# Patient Record
Sex: FEMALE | Race: OTHER | NOT HISPANIC OR LATINO
[De-identification: names, ages, dates, MRNs, and addresses within clinical notes are randomized per-mention and may not be internally consistent; named-entity substitution may affect disease eponyms.]

---

## 2018-06-28 PROBLEM — Z00.00 ENCOUNTER FOR PREVENTIVE HEALTH EXAMINATION: Status: ACTIVE | Noted: 2018-06-28

## 2018-08-04 ENCOUNTER — APPOINTMENT (OUTPATIENT)
Dept: DERMATOLOGY | Facility: CLINIC | Age: 23
End: 2018-08-04
Payer: COMMERCIAL

## 2018-08-04 VITALS
BODY MASS INDEX: 22.28 KG/M2 | WEIGHT: 118 LBS | SYSTOLIC BLOOD PRESSURE: 118 MMHG | DIASTOLIC BLOOD PRESSURE: 80 MMHG | HEIGHT: 61 IN

## 2018-08-04 PROCEDURE — 99202 OFFICE O/P NEW SF 15 MIN: CPT

## 2018-09-22 ENCOUNTER — RX RENEWAL (OUTPATIENT)
Age: 23
End: 2018-09-22

## 2018-09-29 ENCOUNTER — APPOINTMENT (OUTPATIENT)
Dept: DERMATOLOGY | Facility: CLINIC | Age: 23
End: 2018-09-29
Payer: COMMERCIAL

## 2018-09-29 PROCEDURE — 99213 OFFICE O/P EST LOW 20 MIN: CPT

## 2018-10-23 ENCOUNTER — RX RENEWAL (OUTPATIENT)
Age: 23
End: 2018-10-23

## 2018-12-01 ENCOUNTER — APPOINTMENT (OUTPATIENT)
Dept: DERMATOLOGY | Facility: CLINIC | Age: 23
End: 2018-12-01
Payer: COMMERCIAL

## 2018-12-01 PROCEDURE — 99213 OFFICE O/P EST LOW 20 MIN: CPT

## 2019-03-15 ENCOUNTER — APPOINTMENT (OUTPATIENT)
Dept: DERMATOLOGY | Facility: CLINIC | Age: 24
End: 2019-03-15
Payer: COMMERCIAL

## 2019-03-15 VITALS — SYSTOLIC BLOOD PRESSURE: 100 MMHG | DIASTOLIC BLOOD PRESSURE: 80 MMHG

## 2019-03-15 DIAGNOSIS — L81.0 POSTINFLAMMATORY HYPERPIGMENTATION: ICD-10-CM

## 2019-03-15 PROCEDURE — 99213 OFFICE O/P EST LOW 20 MIN: CPT

## 2019-03-15 RX ORDER — TRETINOIN 0.25 MG/G
0.03 CREAM TOPICAL
Qty: 1 | Refills: 4 | Status: ACTIVE | COMMUNITY
Start: 2018-09-29 | End: 1900-01-01

## 2019-03-15 RX ORDER — DOXYCYCLINE HYCLATE 100 MG/1
100 CAPSULE ORAL
Qty: 30 | Refills: 1 | Status: DISCONTINUED | COMMUNITY
Start: 2018-08-04 | End: 2019-03-15

## 2019-03-15 NOTE — HISTORY OF PRESENT ILLNESS
[FreeTextEntry1] : f/u acne [de-identified] : 22 year old female here for acne followup. Acne on face much improved and mostly clear over the past 3 months. Asymptomatic currently. Stopped doxycycline 2/2 nausea in December. Currently using BPO wash, clindamycin swabs, and tretinoin 0.025% cream. Using CeraVe lotions for moisturizer.\par \par Pt reports that she is not pregnant or breastfeeding and knows to stop medication if anything changes.\par \par \par

## 2019-03-15 NOTE — ASSESSMENT
[FreeTextEntry1] : 1) Acne, improved\par Prev on doxycycline, efficacious but stopped 2/2 nausea\par continue with BP before shower, clindamycin after the shower\par Continue with tretinoin 0.025% at night followed by Cerave PM \par \par Pt reports that she is not pregnant or breastfeeding and knows to stop medication if anything changes.\par \par 2) Post-inflammatory hyperpigmentation\par - 2/2 acne\par - Counseled about clinically benign lesions\par - No treatment indicated at this time \par \par RTC 6 months or sooner prn

## 2019-09-04 ENCOUNTER — APPOINTMENT (OUTPATIENT)
Dept: DERMATOLOGY | Facility: CLINIC | Age: 24
End: 2019-09-04
Payer: COMMERCIAL

## 2019-09-04 DIAGNOSIS — L70.0 ACNE VULGARIS: ICD-10-CM

## 2019-09-04 PROCEDURE — 99213 OFFICE O/P EST LOW 20 MIN: CPT

## 2019-09-04 RX ORDER — CLINDAMYCIN PHOSPHATE 10 MG/ML
1 SOLUTION TOPICAL
Qty: 60 | Refills: 5 | Status: ACTIVE | COMMUNITY
Start: 2018-08-04 | End: 1900-01-01

## 2019-09-04 RX ORDER — TRETINOIN 0.5 MG/G
0.05 CREAM TOPICAL
Qty: 1 | Refills: 2 | Status: ACTIVE | COMMUNITY
Start: 2019-09-04 | End: 1900-01-01

## 2020-03-27 ENCOUNTER — APPOINTMENT (OUTPATIENT)
Dept: DERMATOLOGY | Facility: CLINIC | Age: 25
End: 2020-03-27

## 2022-05-10 ENCOUNTER — TRANSCRIPTION ENCOUNTER (OUTPATIENT)
Age: 27
End: 2022-05-10

## 2022-05-10 ENCOUNTER — APPOINTMENT (OUTPATIENT)
Dept: ANTEPARTUM | Facility: CLINIC | Age: 27
End: 2022-05-10
Payer: COMMERCIAL

## 2022-05-10 ENCOUNTER — ASOB RESULT (OUTPATIENT)
Age: 27
End: 2022-05-10

## 2022-05-10 PROCEDURE — 76813 OB US NUCHAL MEAS 1 GEST: CPT

## 2022-05-10 PROCEDURE — 36416 COLLJ CAPILLARY BLOOD SPEC: CPT

## 2022-05-10 PROCEDURE — 76801 OB US < 14 WKS SINGLE FETUS: CPT

## 2022-07-01 ENCOUNTER — APPOINTMENT (OUTPATIENT)
Dept: ANTEPARTUM | Facility: CLINIC | Age: 27
End: 2022-07-01

## 2022-07-01 ENCOUNTER — ASOB RESULT (OUTPATIENT)
Age: 27
End: 2022-07-01

## 2022-07-01 PROCEDURE — 76805 OB US >/= 14 WKS SNGL FETUS: CPT

## 2022-10-13 ENCOUNTER — OUTPATIENT (OUTPATIENT)
Dept: INPATIENT UNIT | Facility: HOSPITAL | Age: 27
LOS: 1 days | Discharge: HOME | End: 2022-10-13

## 2022-10-13 VITALS — HEART RATE: 85 BPM | SYSTOLIC BLOOD PRESSURE: 118 MMHG | DIASTOLIC BLOOD PRESSURE: 73 MMHG

## 2022-10-13 VITALS
SYSTOLIC BLOOD PRESSURE: 125 MMHG | TEMPERATURE: 98 F | HEART RATE: 106 BPM | DIASTOLIC BLOOD PRESSURE: 86 MMHG | RESPIRATION RATE: 17 BRPM

## 2022-10-13 DIAGNOSIS — O26.899 OTHER SPECIFIED PREGNANCY RELATED CONDITIONS, UNSPECIFIED TRIMESTER: ICD-10-CM

## 2022-10-13 DIAGNOSIS — Z3A.00 WEEKS OF GESTATION OF PREGNANCY NOT SPECIFIED: ICD-10-CM

## 2022-10-13 LAB
APPEARANCE UR: ABNORMAL
BACTERIA # UR AUTO: ABNORMAL
BILIRUB UR-MCNC: NEGATIVE — SIGNIFICANT CHANGE UP
COLOR SPEC: SIGNIFICANT CHANGE UP
DIFF PNL FLD: NEGATIVE — SIGNIFICANT CHANGE UP
EPI CELLS # UR: 8 /HPF — HIGH (ref 0–5)
GLUCOSE UR QL: NEGATIVE — SIGNIFICANT CHANGE UP
HYALINE CASTS # UR AUTO: 2 /LPF — SIGNIFICANT CHANGE UP (ref 0–7)
KETONES UR-MCNC: NEGATIVE — SIGNIFICANT CHANGE UP
LEUKOCYTE ESTERASE UR-ACNC: ABNORMAL
NITRITE UR-MCNC: NEGATIVE — SIGNIFICANT CHANGE UP
PH UR: 6.5 — SIGNIFICANT CHANGE UP (ref 5–8)
PROT UR-MCNC: NEGATIVE — SIGNIFICANT CHANGE UP
RBC CASTS # UR COMP ASSIST: 2 /HPF — SIGNIFICANT CHANGE UP (ref 0–4)
SP GR SPEC: 1.01 — SIGNIFICANT CHANGE UP (ref 1.01–1.05)
UROBILINOGEN FLD QL: SIGNIFICANT CHANGE UP
WBC UR QL: 35 /HPF — HIGH (ref 0–5)

## 2022-10-13 PROCEDURE — 76818 FETAL BIOPHYS PROFILE W/NST: CPT | Mod: 26

## 2022-10-13 PROCEDURE — 99204 OFFICE O/P NEW MOD 45 MIN: CPT | Mod: 25

## 2022-10-13 NOTE — OB PROVIDER TRIAGE NOTE - HISTORY OF PRESENT ILLNESS
27yo female  @ 34.5 wks SLIUP here complaining of abdominal tightening, approximately once and hour. Pt also complaining of decreased FM since this morning. Pt reports she had some protein in the urine and just submitted a 24h urine that is still pending results and had pre-eclampsia blood work done on Monday 10/10. Pt denies HA/ RUQ or epigastric pain/ visual changes.    Pmhx- denies  Pshx/Hosp- denies  Meds- PNV; claritin  NKDA  Past ob- SAB x 1  Gyn- denies  Soc- deies

## 2022-10-13 NOTE — OB PROVIDER TRIAGE NOTE - NSHPPHYSICALEXAM_GEN_ALL_CORE
Vital Signs Last 24 Hrs  T(C): 36.7 (13 Oct 2022 13:48), Max: 36.7 (13 Oct 2022 13:37)  T(F): 98.06 (13 Oct 2022 13:48), Max: 98.1 (13 Oct 2022 13:37)  HR: 90 (13 Oct 2022 13:49) (90 - 106)  BP: 138/90 (13 Oct 2022 13:49) (125/86 - 138/90)  BP(mean): --  ABP: --  ABP(mean): --  RR: 17 (13 Oct 2022 13:37) (17 - 17)  SpO2: --    Gen: A&O x 3; NAD    Pulm- CTA B/L; no wheezes  Cor- Clear S1S2  Abd exam- soft and nontender    VE-closed/ 60/-2    NST cat I with 140 baseline with accels and mod variability; no ctx's Vital Signs Last 24 Hrs  T(C): 36.7 (13 Oct 2022 13:48), Max: 36.7 (13 Oct 2022 13:37)  T(F): 98.06 (13 Oct 2022 13:48), Max: 98.1 (13 Oct 2022 13:37)  HR: 93 (13 Oct 2022 14:11) (90 - 106)  BP: 111/71 (13 Oct 2022 14:11) (111/71 - 138/90)  BP(mean): --  ABP: --  ABP(mean): --  RR: 17 (13 Oct 2022 13:37) (17 - 17)  SpO2: --    Gen: A&O x 3; NAD    Pulm- CTA B/L; no wheezes  Cor- Clear S1S2  Abd exam- soft and nontender    VE-closed/ 60/-2    NST cat I with 140 baseline with accels and mod variability; no ctx's  TAS by WILLIAM Spears, NP Vital Signs Last 24 Hrs  T(C): 36.7 (13 Oct 2022 13:48), Max: 36.7 (13 Oct 2022 13:37)  T(F): 98.06 (13 Oct 2022 13:48), Max: 98.1 (13 Oct 2022 13:37)  HR: 93 (13 Oct 2022 14:11) (90 - 106)  BP: 111/71 (13 Oct 2022 14:11) (111/71 - 138/90)  BP(mean): --  ABP: --  ABP(mean): --  RR: 17 (13 Oct 2022 13:37) (17 - 17)  SpO2: --    Gen: A&O x 3; NAD    Pulm- CTA B/L; no wheezes  Cor- Clear S1S2  Abd exam- soft and nontender    VE-closed/ 60/-2    NST cat I with 140 baseline with accels and mod variability; no ctx's  TAS by WILLIAM Spears, NP-vtx; posterior placenta; MELLO-13.36; 8/8 BPP

## 2022-10-13 NOTE — OB PROVIDER TRIAGE NOTE - NSOBPROVIDERNOTE_OBGYN_ALL_OB_FT
27yo female  @ 34.5 wks SLIUP uncomp PNC here complaining of one ctx's an hour with decreased FM  -NST cat I with accels   -TAS is 25yo female  @ 34.5 wks SLIUP uncomp PNC here complaining of one ctx's an hour with decreased FM  -NST cat I with accels   -TAS is 8/8  -pt reports GFM at this time  -pt was dw Dr Salgado  -pt was cleared with dc home with instructions

## 2022-11-10 ENCOUNTER — APPOINTMENT (OUTPATIENT)
Dept: ANTEPARTUM | Facility: CLINIC | Age: 27
End: 2022-11-10

## 2022-11-10 ENCOUNTER — NON-APPOINTMENT (OUTPATIENT)
Age: 27
End: 2022-11-10

## 2022-11-10 ENCOUNTER — ASOB RESULT (OUTPATIENT)
Age: 27
End: 2022-11-10

## 2022-11-10 PROCEDURE — 76816 OB US FOLLOW-UP PER FETUS: CPT

## 2022-11-10 PROCEDURE — 76819 FETAL BIOPHYS PROFIL W/O NST: CPT | Mod: 59

## 2022-11-10 PROCEDURE — 99212 OFFICE O/P EST SF 10 MIN: CPT | Mod: 25

## 2022-11-13 ENCOUNTER — INPATIENT (INPATIENT)
Facility: HOSPITAL | Age: 27
LOS: 1 days | Discharge: HOME | End: 2022-11-15
Attending: OBSTETRICS & GYNECOLOGY | Admitting: OBSTETRICS & GYNECOLOGY

## 2022-11-13 VITALS — HEART RATE: 88 BPM | OXYGEN SATURATION: 100 %

## 2022-11-13 DIAGNOSIS — O26.613 LIVER AND BILIARY TRACT DISORDERS IN PREGNANCY, THIRD TRIMESTER: ICD-10-CM

## 2022-11-13 LAB
ALBUMIN SERPL ELPH-MCNC: 3.6 G/DL — SIGNIFICANT CHANGE UP (ref 3.3–5)
ALP SERPL-CCNC: 211 U/L — HIGH (ref 40–120)
ALT FLD-CCNC: 9 U/L — SIGNIFICANT CHANGE UP (ref 4–33)
ANION GAP SERPL CALC-SCNC: 15 MMOL/L — HIGH (ref 7–14)
AST SERPL-CCNC: 14 U/L — SIGNIFICANT CHANGE UP (ref 4–32)
BASOPHILS # BLD AUTO: 0.02 K/UL — SIGNIFICANT CHANGE UP (ref 0–0.2)
BASOPHILS NFR BLD AUTO: 0.2 % — SIGNIFICANT CHANGE UP (ref 0–2)
BILIRUB SERPL-MCNC: <0.2 MG/DL — SIGNIFICANT CHANGE UP (ref 0.2–1.2)
BLD GP AB SCN SERPL QL: NEGATIVE — SIGNIFICANT CHANGE UP
BUN SERPL-MCNC: 7 MG/DL — SIGNIFICANT CHANGE UP (ref 7–23)
CALCIUM SERPL-MCNC: 9.4 MG/DL — SIGNIFICANT CHANGE UP (ref 8.4–10.5)
CHLORIDE SERPL-SCNC: 107 MMOL/L — SIGNIFICANT CHANGE UP (ref 98–107)
CO2 SERPL-SCNC: 18 MMOL/L — LOW (ref 22–31)
COVID-19 SPIKE DOMAIN AB INTERP: POSITIVE
COVID-19 SPIKE DOMAIN ANTIBODY RESULT: >250 U/ML — HIGH
CREAT SERPL-MCNC: 0.4 MG/DL — LOW (ref 0.5–1.3)
EGFR: 139 ML/MIN/1.73M2 — SIGNIFICANT CHANGE UP
EOSINOPHIL # BLD AUTO: 0.14 K/UL — SIGNIFICANT CHANGE UP (ref 0–0.5)
EOSINOPHIL NFR BLD AUTO: 1.7 % — SIGNIFICANT CHANGE UP (ref 0–6)
GLUCOSE SERPL-MCNC: 97 MG/DL — SIGNIFICANT CHANGE UP (ref 70–99)
HCT VFR BLD CALC: 39.5 % — SIGNIFICANT CHANGE UP (ref 34.5–45)
HGB BLD-MCNC: 13.1 G/DL — SIGNIFICANT CHANGE UP (ref 11.5–15.5)
IANC: 5.72 K/UL — SIGNIFICANT CHANGE UP (ref 1.8–7.4)
IMM GRANULOCYTES NFR BLD AUTO: 0.8 % — SIGNIFICANT CHANGE UP (ref 0–0.9)
LYMPHOCYTES # BLD AUTO: 1.92 K/UL — SIGNIFICANT CHANGE UP (ref 1–3.3)
LYMPHOCYTES # BLD AUTO: 22.7 % — SIGNIFICANT CHANGE UP (ref 13–44)
MCHC RBC-ENTMCNC: 28.4 PG — SIGNIFICANT CHANGE UP (ref 27–34)
MCHC RBC-ENTMCNC: 33.2 GM/DL — SIGNIFICANT CHANGE UP (ref 32–36)
MCV RBC AUTO: 85.7 FL — SIGNIFICANT CHANGE UP (ref 80–100)
MONOCYTES # BLD AUTO: 0.59 K/UL — SIGNIFICANT CHANGE UP (ref 0–0.9)
MONOCYTES NFR BLD AUTO: 7 % — SIGNIFICANT CHANGE UP (ref 2–14)
NEUTROPHILS # BLD AUTO: 5.72 K/UL — SIGNIFICANT CHANGE UP (ref 1.8–7.4)
NEUTROPHILS NFR BLD AUTO: 67.6 % — SIGNIFICANT CHANGE UP (ref 43–77)
NRBC # BLD: 0 /100 WBCS — SIGNIFICANT CHANGE UP (ref 0–0)
NRBC # FLD: 0 K/UL — SIGNIFICANT CHANGE UP (ref 0–0)
PLATELET # BLD AUTO: 203 K/UL — SIGNIFICANT CHANGE UP (ref 150–400)
POTASSIUM SERPL-MCNC: 3.7 MMOL/L — SIGNIFICANT CHANGE UP (ref 3.5–5.3)
POTASSIUM SERPL-SCNC: 3.7 MMOL/L — SIGNIFICANT CHANGE UP (ref 3.5–5.3)
PROT SERPL-MCNC: 7 G/DL — SIGNIFICANT CHANGE UP (ref 6–8.3)
RBC # BLD: 4.61 M/UL — SIGNIFICANT CHANGE UP (ref 3.8–5.2)
RBC # FLD: 16 % — HIGH (ref 10.3–14.5)
RH IG SCN BLD-IMP: POSITIVE — SIGNIFICANT CHANGE UP
RH IG SCN BLD-IMP: POSITIVE — SIGNIFICANT CHANGE UP
SARS-COV-2 IGG+IGM SERPL QL IA: >250 U/ML — HIGH
SARS-COV-2 IGG+IGM SERPL QL IA: POSITIVE
SODIUM SERPL-SCNC: 140 MMOL/L — SIGNIFICANT CHANGE UP (ref 135–145)
WBC # BLD: 8.46 K/UL — SIGNIFICANT CHANGE UP (ref 3.8–10.5)
WBC # FLD AUTO: 8.46 K/UL — SIGNIFICANT CHANGE UP (ref 3.8–10.5)

## 2022-11-13 RX ORDER — SODIUM CHLORIDE 9 MG/ML
1000 INJECTION, SOLUTION INTRAVENOUS
Refills: 0 | Status: DISCONTINUED | OUTPATIENT
Start: 2022-11-13 | End: 2022-11-14

## 2022-11-13 RX ORDER — ACETAMINOPHEN 500 MG
1000 TABLET ORAL ONCE
Refills: 0 | Status: COMPLETED | OUTPATIENT
Start: 2022-11-13 | End: 2022-11-13

## 2022-11-13 RX ORDER — CALCIUM CARBONATE 500(1250)
1 TABLET ORAL EVERY 8 HOURS
Refills: 0 | Status: DISCONTINUED | OUTPATIENT
Start: 2022-11-13 | End: 2022-11-15

## 2022-11-13 RX ORDER — CHLORHEXIDINE GLUCONATE 213 G/1000ML
1 SOLUTION TOPICAL ONCE
Refills: 0 | Status: DISCONTINUED | OUTPATIENT
Start: 2022-11-13 | End: 2022-11-14

## 2022-11-13 RX ORDER — OXYTOCIN 10 UNIT/ML
333.33 VIAL (ML) INJECTION
Qty: 20 | Refills: 0 | Status: DISCONTINUED | OUTPATIENT
Start: 2022-11-13 | End: 2022-11-14

## 2022-11-13 RX ORDER — ONDANSETRON 8 MG/1
4 TABLET, FILM COATED ORAL ONCE
Refills: 0 | Status: COMPLETED | OUTPATIENT
Start: 2022-11-13 | End: 2022-11-13

## 2022-11-13 RX ORDER — CITRIC ACID/SODIUM CITRATE 300-500 MG
15 SOLUTION, ORAL ORAL EVERY 6 HOURS
Refills: 0 | Status: DISCONTINUED | OUTPATIENT
Start: 2022-11-13 | End: 2022-11-14

## 2022-11-13 RX ADMIN — Medication 400 MILLIGRAM(S): at 23:34

## 2022-11-13 RX ADMIN — SODIUM CHLORIDE 125 MILLILITER(S): 9 INJECTION, SOLUTION INTRAVENOUS at 23:36

## 2022-11-13 RX ADMIN — ONDANSETRON 4 MILLIGRAM(S): 8 TABLET, FILM COATED ORAL at 23:56

## 2022-11-13 RX ADMIN — Medication 1 TABLET(S): at 22:13
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
## 2022-11-13 NOTE — OB PROVIDER H&P - NSICDXFAMILYHX_GEN_ALL_CORE_FT
FAMILY HISTORY:  Father  Still living? Unknown  FH: type 2 diabetes, Age at diagnosis: Age Unknown    Mother  Still living? Unknown  Family hx of hypertension, Age at diagnosis: Age Unknown

## 2022-11-13 NOTE — OB PROVIDER H&P - ASSESSMENT
A&P: Pt is a 25y/o  at 39w1d admitted for rrIOL.  Induction: admit to L&D  -buccal cytotec  - cervical balloon  -routine labs + CMP  -EFM/toco  -CLD, IV hydration  Fetal: cat 1 tracing, fetal status reassuring  GBS: neg  Analgesia: TBD      d/w Dr. Shari Mae PGY1

## 2022-11-14 LAB — T PALLIDUM AB TITR SER: NEGATIVE — SIGNIFICANT CHANGE UP

## 2022-11-14 RX ORDER — AER TRAVELER 0.5 G/1
1 SOLUTION RECTAL; TOPICAL EVERY 4 HOURS
Refills: 0 | Status: DISCONTINUED | OUTPATIENT
Start: 2022-11-14 | End: 2022-11-15

## 2022-11-14 RX ORDER — SODIUM CHLORIDE 9 MG/ML
3 INJECTION INTRAMUSCULAR; INTRAVENOUS; SUBCUTANEOUS EVERY 8 HOURS
Refills: 0 | Status: DISCONTINUED | OUTPATIENT
Start: 2022-11-14 | End: 2022-11-15

## 2022-11-14 RX ORDER — ACETAMINOPHEN 500 MG
975 TABLET ORAL
Refills: 0 | Status: DISCONTINUED | OUTPATIENT
Start: 2022-11-14 | End: 2022-11-15

## 2022-11-14 RX ORDER — IBUPROFEN 200 MG
600 TABLET ORAL EVERY 6 HOURS
Refills: 0 | Status: COMPLETED | OUTPATIENT
Start: 2022-11-14 | End: 2023-10-13

## 2022-11-14 RX ORDER — LANOLIN
1 OINTMENT (GRAM) TOPICAL EVERY 6 HOURS
Refills: 0 | Status: DISCONTINUED | OUTPATIENT
Start: 2022-11-14 | End: 2022-11-15

## 2022-11-14 RX ORDER — OXYCODONE HYDROCHLORIDE 5 MG/1
5 TABLET ORAL
Refills: 0 | Status: DISCONTINUED | OUTPATIENT
Start: 2022-11-14 | End: 2022-11-15

## 2022-11-14 RX ORDER — DIPHENHYDRAMINE HCL 50 MG
25 CAPSULE ORAL EVERY 6 HOURS
Refills: 0 | Status: DISCONTINUED | OUTPATIENT
Start: 2022-11-14 | End: 2022-11-15

## 2022-11-14 RX ORDER — DIBUCAINE 1 %
1 OINTMENT (GRAM) RECTAL EVERY 6 HOURS
Refills: 0 | Status: DISCONTINUED | OUTPATIENT
Start: 2022-11-14 | End: 2022-11-15

## 2022-11-14 RX ORDER — PRAMOXINE HYDROCHLORIDE 150 MG/15G
1 AEROSOL, FOAM RECTAL EVERY 4 HOURS
Refills: 0 | Status: DISCONTINUED | OUTPATIENT
Start: 2022-11-14 | End: 2022-11-15

## 2022-11-14 RX ORDER — OXYCODONE HYDROCHLORIDE 5 MG/1
5 TABLET ORAL ONCE
Refills: 0 | Status: DISCONTINUED | OUTPATIENT
Start: 2022-11-14 | End: 2022-11-15

## 2022-11-14 RX ORDER — IBUPROFEN 200 MG
600 TABLET ORAL EVERY 6 HOURS
Refills: 0 | Status: DISCONTINUED | OUTPATIENT
Start: 2022-11-14 | End: 2022-11-15

## 2022-11-14 RX ORDER — BENZOCAINE 10 %
1 GEL (GRAM) MUCOUS MEMBRANE EVERY 6 HOURS
Refills: 0 | Status: DISCONTINUED | OUTPATIENT
Start: 2022-11-14 | End: 2022-11-15

## 2022-11-14 RX ORDER — TETANUS TOXOID, REDUCED DIPHTHERIA TOXOID AND ACELLULAR PERTUSSIS VACCINE, ADSORBED 5; 2.5; 8; 8; 2.5 [IU]/.5ML; [IU]/.5ML; UG/.5ML; UG/.5ML; UG/.5ML
0.5 SUSPENSION INTRAMUSCULAR ONCE
Refills: 0 | Status: DISCONTINUED | OUTPATIENT
Start: 2022-11-14 | End: 2022-11-15

## 2022-11-14 RX ORDER — OXYTOCIN 10 UNIT/ML
41.67 VIAL (ML) INJECTION
Qty: 20 | Refills: 0 | Status: DISCONTINUED | OUTPATIENT
Start: 2022-11-14 | End: 2022-11-15

## 2022-11-14 RX ORDER — HYDROCORTISONE 1 %
1 OINTMENT (GRAM) TOPICAL EVERY 6 HOURS
Refills: 0 | Status: DISCONTINUED | OUTPATIENT
Start: 2022-11-14 | End: 2022-11-15

## 2022-11-14 RX ORDER — SIMETHICONE 80 MG/1
80 TABLET, CHEWABLE ORAL EVERY 4 HOURS
Refills: 0 | Status: DISCONTINUED | OUTPATIENT
Start: 2022-11-14 | End: 2022-11-15

## 2022-11-14 RX ORDER — MAGNESIUM HYDROXIDE 400 MG/1
30 TABLET, CHEWABLE ORAL
Refills: 0 | Status: DISCONTINUED | OUTPATIENT
Start: 2022-11-14 | End: 2022-11-15

## 2022-11-14 RX ORDER — KETOROLAC TROMETHAMINE 30 MG/ML
30 SYRINGE (ML) INJECTION ONCE
Refills: 0 | Status: DISCONTINUED | OUTPATIENT
Start: 2022-11-14 | End: 2022-11-14

## 2022-11-14 RX ORDER — OXYTOCIN 10 UNIT/ML
2 VIAL (ML) INJECTION
Qty: 30 | Refills: 0 | Status: DISCONTINUED | OUTPATIENT
Start: 2022-11-14 | End: 2022-11-14

## 2022-11-14 RX ORDER — OXYTOCIN 10 UNIT/ML
333.33 VIAL (ML) INJECTION
Qty: 20 | Refills: 0 | Status: DISCONTINUED | OUTPATIENT
Start: 2022-11-14 | End: 2022-11-15

## 2022-11-14 RX ADMIN — SODIUM CHLORIDE 3 MILLILITER(S): 9 INJECTION INTRAMUSCULAR; INTRAVENOUS; SUBCUTANEOUS at 22:05

## 2022-11-14 RX ADMIN — Medication 600 MILLIGRAM(S): at 18:31

## 2022-11-14 RX ADMIN — Medication 975 MILLIGRAM(S): at 16:55

## 2022-11-14 RX ADMIN — Medication 600 MILLIGRAM(S): at 17:49

## 2022-11-14 RX ADMIN — Medication 30 MILLIGRAM(S): at 11:44

## 2022-11-14 RX ADMIN — Medication 975 MILLIGRAM(S): at 20:06

## 2022-11-14 RX ADMIN — Medication 975 MILLIGRAM(S): at 16:19

## 2022-11-14 RX ADMIN — SODIUM CHLORIDE 3 MILLILITER(S): 9 INJECTION INTRAMUSCULAR; INTRAVENOUS; SUBCUTANEOUS at 14:00

## 2022-11-14 RX ADMIN — Medication 15 MILLILITER(S): at 05:45

## 2022-11-14 RX ADMIN — Medication 1000 MILLIUNIT(S)/MIN: at 11:59

## 2022-11-14 RX ADMIN — Medication 2 MILLIUNIT(S)/MIN: at 05:21

## 2022-11-14 RX ADMIN — Medication 1000 MILLIGRAM(S): at 00:15

## 2022-11-14 RX ADMIN — Medication 975 MILLIGRAM(S): at 21:05

## 2022-11-14 NOTE — OB PROVIDER LABOR PROGRESS NOTE - ASSESSMENT
- s/p cervical balloon  - pt requesting pain medication, too dilated for stadol. Pt to be brought downstairs for anesthesia evaluation for epidural     Aliza Corado, PGY2  d/w Dr. Sinha 
Ericka NUÑEZ @530a  - Continue mayuri Randolph, PGY1  d/w Dr. Sinha  
rrIOL  - FRANKLIN inserted @830  - [facility]    Cee Randolph, PGY1  As per plan previously d/w Dr Mccarthy

## 2022-11-14 NOTE — OB NEONATOLOGY/PEDIATRICIAN DELIVERY SUMMARY - NSPEDSNEONOTESA_OBGYN_ALL_OB_FT
Called by OB to attend  vacuum assisted vaginal delivery. Baby is  product of a  39 2/7 week gestation born to a     27 year old female   Maternal labs include Blood Type  AB+ , HIV neg, RPR neg , Hep B[ - ], GBS neg , rest is unremarkable. No significant maternal history. No pregnancy complications. ROM at  0536 11 , approximately  4 hrs.  Resuscitation included: warmed, dried, stimulated, suctioned.  Apgars were: 8/9.  Admit to  nursery. Called by OB to attend vacuum assisted vaginal delivery for category 2 tracing. Baby is product of a  39 2/7 week gestation born to a     27 year old female   Maternal labs include Blood Type  AB+ , HIV neg, RPR neg , Hep B[-], GBS neg, rest is unremarkable. No significant maternal history. No pregnancy complications. ROM at  0536 1114 , approximately  4 hrs. Nuchal cord noted at delivery. Terminal meconium noted at delivery. Resuscitation included: warmed, dried, stimulated, suctioned.  Apgars were: 8/9.  Admit to  nursery.

## 2022-11-14 NOTE — OB PROVIDER LABOR PROGRESS NOTE - NS_SUBJECTIVE/OBJECTIVE_OBGYN_ALL_OB_FT
Pt checked for rectal pressure
VE to evaluate progress in labor. Pt feeling increased pain.
Pt checked for CB

## 2022-11-14 NOTE — OB RN DELIVERY SUMMARY - NSSELHIDDEN_OBGYN_ALL_OB_FT
[NS_DeliveryAttending1_OBGYN_ALL_OB_FT:[id number]],[NS_DeliveryAssist1_OBGYN_ALL_OB_FT:[id number]],[NS_DeliveryRN_OBGYN_ALL_OB_FT:[id number]],[NS_CirculateRN2_OBGYN_ALL_OB_FT:[id number]]

## 2022-11-14 NOTE — LACTATION INITIAL EVALUATION - LACTATION INTERVENTIONS
initiate/review safe skin-to-skin/initiate/review hand expression/initiate/review techniques for position and latch/post discharge community resources provided/review techniques to manage sore nipples/engorgement/initiate/review breast massage/compression/reviewed components of an effective feeding and at least 8 effective feedings per day required/reviewed importance of monitoring infant diapers, the breastfeeding log, and minimum output each day/reviewed benefits and recommendations for rooming in/reviewed feeding on demand/by cue at least 8 times a day

## 2022-11-14 NOTE — OB RN DELIVERY SUMMARY - NS_SEPSISRSKCALC_OBGYN_ALL_OB_FT
EOS calculated successfully. EOS Risk Factor: 0.5/1000 live births ([facility] national incidence); GA=39w2d; Temp=98.1; ROM=3.967; GBS='Negative'; Antibiotics='No antibiotics or any antibiotics < 2 hrs prior to birth'

## 2022-11-14 NOTE — OB PROVIDER DELIVERY SUMMARY - NSPROVIDERDELIVERYNOTE_OBGYN_ALL_OB_FT
Vacuum Assisted Delivery Note    Category 2 tracing, peds present for delivery    Patient fully dilated and pushing.  Due to Category II tracing,  the decision was made to procedure with operative vaginal delivery. The patient was informed. Verbal consent was given. Episiotomy was NOT performed.  Position was LEOPOLDO. Vacuum was applied at +2 station, suction pressure at 700mm Hg initiated and gradual traction initiated with maternal pushing. Head delivered easily with 3 pulls and NO pop off. Vacuum suction was released and the vacuum removed from the fetal head. Nuchal cord noted wrapped around Right leg x1, and around neck x 1.   The anterior shoulder delivered easily with maternal expulsive efforts with the assistance of downward guidance. The posterior shoulder delivered with maternal expulsive efforts and upward guidance. The body of the fetus delivered spontaneously.  Delivery of a viable female infant. Cord clamped x 2 and cut.  Infant was handed to awaiting pediatrician. Lidocaine was infiltrated into the vaginal tissue. Second degree laceration was repaired with 2-0 Chromic, Left sulcus tare repaired with 2-0 chrmonic.   The placenta delivered spontaneously intact. Fundal massage was performed and the uterus was noted to have AVRIL atony, vigorous massage performed, clots expressed, uterus noted to be firm. IV oxytocin was administered.  Cytotec KS was given for AVRIL atony that resolved with bimanual vigorous massage. Excellent hemostasis achieved. Lap and sponge count correct. Vaginal vault free of sponges. Mother and baby to recovery in stable condition.       Surgeon:  MD Betancourt    Assistant:  JULIANN Ramirez    EBL: 791 cc     Apgars: 8/9     WeightL 2765g, 6#1

## 2022-11-14 NOTE — OB PROVIDER LABOR PROGRESS NOTE - NS_OBIHIFHRDETAILS_OBGYN_ALL_OB_FT
baseline 135, mod hernandez, occasional variables
140s, moderate variability, accels, no decels
baseline 140, mod hernandez, +accels, -decels

## 2022-11-14 NOTE — OB PROVIDER DELIVERY SUMMARY - NSSELHIDDEN_OBGYN_ALL_OB_FT
[NS_DeliveryAttending1_OBGYN_ALL_OB_FT:[id number]],[NS_DeliveryAssist1_OBGYN_ALL_OB_FT:[id number]]

## 2022-11-15 ENCOUNTER — TRANSCRIPTION ENCOUNTER (OUTPATIENT)
Age: 27
End: 2022-11-15

## 2022-11-15 VITALS
SYSTOLIC BLOOD PRESSURE: 120 MMHG | RESPIRATION RATE: 18 BRPM | HEART RATE: 89 BPM | DIASTOLIC BLOOD PRESSURE: 78 MMHG | TEMPERATURE: 98 F | OXYGEN SATURATION: 100 %

## 2022-11-15 LAB
BASOPHILS # BLD AUTO: 0.04 K/UL — SIGNIFICANT CHANGE UP (ref 0–0.2)
BASOPHILS NFR BLD AUTO: 0.3 % — SIGNIFICANT CHANGE UP (ref 0–2)
EOSINOPHIL # BLD AUTO: 0.12 K/UL — SIGNIFICANT CHANGE UP (ref 0–0.5)
EOSINOPHIL NFR BLD AUTO: 0.8 % — SIGNIFICANT CHANGE UP (ref 0–6)
HCT VFR BLD CALC: 31.2 % — LOW (ref 34.5–45)
HGB BLD-MCNC: 10.4 G/DL — LOW (ref 11.5–15.5)
IANC: 11.49 K/UL — HIGH (ref 1.8–7.4)
IMM GRANULOCYTES NFR BLD AUTO: 0.6 % — SIGNIFICANT CHANGE UP (ref 0–0.9)
LYMPHOCYTES # BLD AUTO: 18.9 % — SIGNIFICANT CHANGE UP (ref 13–44)
LYMPHOCYTES # BLD AUTO: 2.98 K/UL — SIGNIFICANT CHANGE UP (ref 1–3.3)
MCHC RBC-ENTMCNC: 29.1 PG — SIGNIFICANT CHANGE UP (ref 27–34)
MCHC RBC-ENTMCNC: 33.3 GM/DL — SIGNIFICANT CHANGE UP (ref 32–36)
MCV RBC AUTO: 87.4 FL — SIGNIFICANT CHANGE UP (ref 80–100)
MONOCYTES # BLD AUTO: 1.02 K/UL — HIGH (ref 0–0.9)
MONOCYTES NFR BLD AUTO: 6.5 % — SIGNIFICANT CHANGE UP (ref 2–14)
NEUTROPHILS # BLD AUTO: 11.49 K/UL — HIGH (ref 1.8–7.4)
NEUTROPHILS NFR BLD AUTO: 72.9 % — SIGNIFICANT CHANGE UP (ref 43–77)
NRBC # BLD: 0 /100 WBCS — SIGNIFICANT CHANGE UP (ref 0–0)
NRBC # FLD: 0 K/UL — SIGNIFICANT CHANGE UP (ref 0–0)
PLATELET # BLD AUTO: 169 K/UL — SIGNIFICANT CHANGE UP (ref 150–400)
RBC # BLD: 3.57 M/UL — LOW (ref 3.8–5.2)
RBC # FLD: 15.9 % — HIGH (ref 10.3–14.5)
WBC # BLD: 15.75 K/UL — HIGH (ref 3.8–10.5)
WBC # FLD AUTO: 15.75 K/UL — HIGH (ref 3.8–10.5)

## 2022-11-15 RX ORDER — LORATADINE 10 MG/1
0 TABLET ORAL
Qty: 0 | Refills: 0 | DISCHARGE

## 2022-11-15 RX ORDER — DIBUCAINE 1 %
1 OINTMENT (GRAM) RECTAL
Qty: 0 | Refills: 0 | DISCHARGE
Start: 2022-11-15

## 2022-11-15 RX ORDER — HYDROCORTISONE 1 %
1 OINTMENT (GRAM) TOPICAL
Qty: 0 | Refills: 0 | DISCHARGE
Start: 2022-11-15

## 2022-11-15 RX ORDER — IBUPROFEN 200 MG
1 TABLET ORAL
Qty: 0 | Refills: 0 | DISCHARGE
Start: 2022-11-15

## 2022-11-15 RX ORDER — ACETAMINOPHEN 500 MG
3 TABLET ORAL
Qty: 0 | Refills: 0 | DISCHARGE
Start: 2022-11-15

## 2022-11-15 RX ORDER — AER TRAVELER 0.5 G/1
1 SOLUTION RECTAL; TOPICAL
Qty: 0 | Refills: 0 | DISCHARGE
Start: 2022-11-15

## 2022-11-15 RX ADMIN — Medication 600 MILLIGRAM(S): at 05:19

## 2022-11-15 RX ADMIN — SODIUM CHLORIDE 3 MILLILITER(S): 9 INJECTION INTRAMUSCULAR; INTRAVENOUS; SUBCUTANEOUS at 06:02

## 2022-11-15 RX ADMIN — Medication 600 MILLIGRAM(S): at 15:12

## 2022-11-15 RX ADMIN — Medication 975 MILLIGRAM(S): at 09:34

## 2022-11-15 RX ADMIN — Medication 975 MILLIGRAM(S): at 03:11

## 2022-11-15 RX ADMIN — Medication 600 MILLIGRAM(S): at 01:45

## 2022-11-15 RX ADMIN — Medication 975 MILLIGRAM(S): at 17:30

## 2022-11-15 RX ADMIN — Medication 600 MILLIGRAM(S): at 16:00

## 2022-11-15 RX ADMIN — Medication 600 MILLIGRAM(S): at 00:51

## 2022-11-15 RX ADMIN — Medication 975 MILLIGRAM(S): at 04:02

## 2022-11-15 RX ADMIN — Medication 600 MILLIGRAM(S): at 06:13

## 2022-11-15 RX ADMIN — Medication 975 MILLIGRAM(S): at 18:00

## 2022-11-15 RX ADMIN — Medication 975 MILLIGRAM(S): at 08:54

## 2022-11-15 NOTE — DISCHARGE NOTE OB - PATIENT PORTAL LINK FT
You can access the FollowMyHealth Patient Portal offered by [facility] by registering at the following website: [email or web address]. By joining [employer]’s FollowMyHealth portal, you will also be able to view your health information using other applications (apps) compatible with our system.

## 2022-11-15 NOTE — DISCHARGE NOTE OB - CARE PLAN
1 Principal Discharge DX:	Spontaneous vaginal delivery  Assessment and plan of treatment:	routine pp recovery

## 2022-11-15 NOTE — DISCHARGE NOTE OB - MEDICATION SUMMARY - MEDICATIONS TO TAKE
I will START or STAY ON the medications listed below when I get home from the hospital:    acetaminophen 325 mg oral tablet  -- 3 tab(s) by mouth every 8 hours, As Needed  -- Indication: For mild to mod pain    ibuprofen 600 mg oral tablet  -- 1 tab(s) by mouth every 6 hours, As Needed  -- Indication: For mild to mod pain    hydrocortisone 1% topical cream  -- 1 application on skin every 6 hours, As needed, Moderate Pain (4-6)  -- Indication: For hemorrhoids     dibucaine 1% topical ointment  -- 1 application on skin every 6 hours, As needed, Perineal discomfort  -- Indication: For hemorrhoids     witch hazel 50% topical pad  -- 1 application on skin every 4 hours, As needed, Perineal discomfort  -- Indication: For hemorrhoids

## 2022-11-15 NOTE — DISCHARGE NOTE OB - NS MD DC FALL RISK RISK
For information on Fall & Injury Prevention, visit: [email or web address] OR  [email or web address] OR  https://www.cdc.gov/steadi/patient.html

## 2022-11-15 NOTE — DISCHARGE NOTE OB - CARE PROVIDER_API CALL
Lucille Salgado)  Obstetrics and Gynecology  [address] [place] [address]  Phone: [phone]  Fax: [phone]  Follow Up Time:

## 2022-11-17 ENCOUNTER — INPATIENT (INPATIENT)
Facility: HOSPITAL | Age: 27
LOS: 2 days | Discharge: HOME | End: 2022-11-20
Attending: OBSTETRICS & GYNECOLOGY | Admitting: OBSTETRICS & GYNECOLOGY

## 2022-11-17 VITALS — HEART RATE: 87 BPM | SYSTOLIC BLOOD PRESSURE: 120 MMHG | DIASTOLIC BLOOD PRESSURE: 80 MMHG

## 2022-11-17 DIAGNOSIS — O14.90 UNSPECIFIED PRE-ECLAMPSIA, UNSPECIFIED TRIMESTER: ICD-10-CM

## 2022-11-17 LAB
ALBUMIN SERPL ELPH-MCNC: 3.3 G/DL — SIGNIFICANT CHANGE UP (ref 3.3–5)
ALP SERPL-CCNC: 143 U/L — HIGH (ref 40–120)
ALT FLD-CCNC: 37 U/L — HIGH (ref 4–33)
ANION GAP SERPL CALC-SCNC: 9 MMOL/L — SIGNIFICANT CHANGE UP (ref 7–14)
APTT BLD: 27.7 SEC — SIGNIFICANT CHANGE UP (ref 27–36.3)
AST SERPL-CCNC: 34 U/L — HIGH (ref 4–32)
BASOPHILS # BLD AUTO: 0.05 K/UL — SIGNIFICANT CHANGE UP (ref 0–0.2)
BASOPHILS NFR BLD AUTO: 0.5 % — SIGNIFICANT CHANGE UP (ref 0–2)
BILIRUB SERPL-MCNC: <0.2 MG/DL — SIGNIFICANT CHANGE UP (ref 0.2–1.2)
BUN SERPL-MCNC: 7 MG/DL — SIGNIFICANT CHANGE UP (ref 7–23)
CALCIUM SERPL-MCNC: 8.8 MG/DL — SIGNIFICANT CHANGE UP (ref 8.4–10.5)
CHLORIDE SERPL-SCNC: 109 MMOL/L — HIGH (ref 98–107)
CO2 SERPL-SCNC: 24 MMOL/L — SIGNIFICANT CHANGE UP (ref 22–31)
CREAT SERPL-MCNC: 0.54 MG/DL — SIGNIFICANT CHANGE UP (ref 0.5–1.3)
EGFR: 129 ML/MIN/1.73M2 — SIGNIFICANT CHANGE UP
EOSINOPHIL # BLD AUTO: 0.41 K/UL — SIGNIFICANT CHANGE UP (ref 0–0.5)
EOSINOPHIL NFR BLD AUTO: 4.1 % — SIGNIFICANT CHANGE UP (ref 0–6)
FIBRINOGEN PPP-MCNC: 497 MG/DL — HIGH (ref 200–465)
GLUCOSE SERPL-MCNC: 96 MG/DL — SIGNIFICANT CHANGE UP (ref 70–99)
HCT VFR BLD CALC: 31.2 % — LOW (ref 34.5–45)
HGB BLD-MCNC: 10.3 G/DL — LOW (ref 11.5–15.5)
IANC: 5.82 K/UL — SIGNIFICANT CHANGE UP (ref 1.8–7.4)
IMM GRANULOCYTES NFR BLD AUTO: 0.8 % — SIGNIFICANT CHANGE UP (ref 0–0.9)
INR BLD: <0.9 RATIO — SIGNIFICANT CHANGE UP (ref 0.88–1.16)
LDH SERPL L TO P-CCNC: 245 U/L — HIGH (ref 135–225)
LYMPHOCYTES # BLD AUTO: 2.76 K/UL — SIGNIFICANT CHANGE UP (ref 1–3.3)
LYMPHOCYTES # BLD AUTO: 27.9 % — SIGNIFICANT CHANGE UP (ref 13–44)
MCHC RBC-ENTMCNC: 28.9 PG — SIGNIFICANT CHANGE UP (ref 27–34)
MCHC RBC-ENTMCNC: 33 GM/DL — SIGNIFICANT CHANGE UP (ref 32–36)
MCV RBC AUTO: 87.4 FL — SIGNIFICANT CHANGE UP (ref 80–100)
MONOCYTES # BLD AUTO: 0.76 K/UL — SIGNIFICANT CHANGE UP (ref 0–0.9)
MONOCYTES NFR BLD AUTO: 7.7 % — SIGNIFICANT CHANGE UP (ref 2–14)
NEUTROPHILS # BLD AUTO: 5.82 K/UL — SIGNIFICANT CHANGE UP (ref 1.8–7.4)
NEUTROPHILS NFR BLD AUTO: 59 % — SIGNIFICANT CHANGE UP (ref 43–77)
NRBC # BLD: 0 /100 WBCS — SIGNIFICANT CHANGE UP (ref 0–0)
NRBC # FLD: 0 K/UL — SIGNIFICANT CHANGE UP (ref 0–0)
PLATELET # BLD AUTO: 243 K/UL — SIGNIFICANT CHANGE UP (ref 150–400)
POTASSIUM SERPL-MCNC: 4.2 MMOL/L — SIGNIFICANT CHANGE UP (ref 3.5–5.3)
POTASSIUM SERPL-SCNC: 4.2 MMOL/L — SIGNIFICANT CHANGE UP (ref 3.5–5.3)
PROT SERPL-MCNC: 6.6 G/DL — SIGNIFICANT CHANGE UP (ref 6–8.3)
PROTHROM AB SERPL-ACNC: 10.3 SEC — LOW (ref 10.5–13.4)
RBC # BLD: 3.57 M/UL — LOW (ref 3.8–5.2)
RBC # FLD: 15.9 % — HIGH (ref 10.3–14.5)
SODIUM SERPL-SCNC: 142 MMOL/L — SIGNIFICANT CHANGE UP (ref 135–145)
URATE SERPL-MCNC: 5.3 MG/DL — SIGNIFICANT CHANGE UP (ref 2.5–7)
WBC # BLD: 9.88 K/UL — SIGNIFICANT CHANGE UP (ref 3.8–10.5)
WBC # FLD AUTO: 9.88 K/UL — SIGNIFICANT CHANGE UP (ref 3.8–10.5)

## 2022-11-17 RX ORDER — NIFEDIPINE 30 MG
10 TABLET, EXTENDED RELEASE 24 HR ORAL ONCE
Refills: 0 | Status: COMPLETED | OUTPATIENT
Start: 2022-11-17 | End: 2022-11-17

## 2022-11-17 RX ORDER — NIFEDIPINE 30 MG
30 TABLET, EXTENDED RELEASE 24 HR ORAL DAILY
Refills: 0 | Status: DISCONTINUED | OUTPATIENT
Start: 2022-11-17 | End: 2022-11-19

## 2022-11-17 RX ORDER — MAGNESIUM SULFATE 500 MG/ML
4 VIAL (ML) INJECTION ONCE
Refills: 0 | Status: COMPLETED | OUTPATIENT
Start: 2022-11-17 | End: 2022-11-17

## 2022-11-17 RX ORDER — MAGNESIUM SULFATE 500 MG/ML
2 VIAL (ML) INJECTION
Qty: 40 | Refills: 0 | Status: DISCONTINUED | OUTPATIENT
Start: 2022-11-17 | End: 2022-11-17

## 2022-11-17 RX ORDER — MAGNESIUM SULFATE 500 MG/ML
2 VIAL (ML) INJECTION
Qty: 40 | Refills: 0 | Status: DISCONTINUED | OUTPATIENT
Start: 2022-11-17 | End: 2022-11-18

## 2022-11-17 RX ADMIN — Medication 10 MILLIGRAM(S): at 22:34

## 2022-11-17 RX ADMIN — Medication 10 MILLIGRAM(S): at 22:07

## 2022-11-17 RX ADMIN — Medication 300 GRAM(S): at 22:48
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
## 2022-11-17 NOTE — H&P ADULT - PROBLEM SELECTOR PLAN 1
Admit for PP sPEC  D/W Dr. Betancourt  Routine Orders  HELLP labs pending  Magnesium Sulfate for seizure precaution  Procardia IR 10mg given x2  Procardia XL 30mg ordered  Serial BPs

## 2022-11-17 NOTE — H&P ADULT - NSHPPHYSICALEXAM_GEN_ALL_CORE
Assessment reveals VS  175/90, 212/96, 182/106  Afebrile   Abdomen soft, NT, gravid  A&Ox3  Lungs- clear bilateral  Heart- normal rate and regular rhythm  Extremities- Warm, Dry, no edema present, good pulses       PLAN:  Admit for PP sPEC

## 2022-11-18 PROBLEM — D64.9 ANEMIA, UNSPECIFIED: Chronic | Status: ACTIVE | Noted: 2022-11-13
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
## 2022-11-18 LAB
ALBUMIN SERPL ELPH-MCNC: 4.2 G/DL — SIGNIFICANT CHANGE UP (ref 3.3–5)
ALP SERPL-CCNC: 181 U/L — HIGH (ref 40–120)
ALT FLD-CCNC: 41 U/L — HIGH (ref 4–33)
ANION GAP SERPL CALC-SCNC: 14 MMOL/L — SIGNIFICANT CHANGE UP (ref 7–14)
APTT BLD: 31 SEC — SIGNIFICANT CHANGE UP (ref 27–36.3)
AST SERPL-CCNC: 31 U/L — SIGNIFICANT CHANGE UP (ref 4–32)
BASOPHILS # BLD AUTO: 0.03 K/UL — SIGNIFICANT CHANGE UP (ref 0–0.2)
BASOPHILS NFR BLD AUTO: 0.3 % — SIGNIFICANT CHANGE UP (ref 0–2)
BILIRUB SERPL-MCNC: <0.2 MG/DL — SIGNIFICANT CHANGE UP (ref 0.2–1.2)
BUN SERPL-MCNC: 4 MG/DL — LOW (ref 7–23)
CALCIUM SERPL-MCNC: 7.4 MG/DL — LOW (ref 8.4–10.5)
CHLORIDE SERPL-SCNC: 101 MMOL/L — SIGNIFICANT CHANGE UP (ref 98–107)
CO2 SERPL-SCNC: 23 MMOL/L — SIGNIFICANT CHANGE UP (ref 22–31)
CREAT SERPL-MCNC: 0.39 MG/DL — LOW (ref 0.5–1.3)
EGFR: 139 ML/MIN/1.73M2 — SIGNIFICANT CHANGE UP
EOSINOPHIL # BLD AUTO: 0.17 K/UL — SIGNIFICANT CHANGE UP (ref 0–0.5)
EOSINOPHIL NFR BLD AUTO: 1.9 % — SIGNIFICANT CHANGE UP (ref 0–6)
FIBRINOGEN PPP-MCNC: 603 MG/DL — HIGH (ref 200–465)
GLUCOSE SERPL-MCNC: 101 MG/DL — HIGH (ref 70–99)
HCT VFR BLD CALC: 38.6 % — SIGNIFICANT CHANGE UP (ref 34.5–45)
HGB BLD-MCNC: 12.9 G/DL — SIGNIFICANT CHANGE UP (ref 11.5–15.5)
IANC: 6.67 K/UL — SIGNIFICANT CHANGE UP (ref 1.8–7.4)
IMM GRANULOCYTES NFR BLD AUTO: 0.9 % — SIGNIFICANT CHANGE UP (ref 0–0.9)
INR BLD: 0.86 RATIO — SIGNIFICANT CHANGE UP (ref 0.88–1.16)
LDH SERPL L TO P-CCNC: 305 U/L — HIGH (ref 135–225)
LYMPHOCYTES # BLD AUTO: 1.78 K/UL — SIGNIFICANT CHANGE UP (ref 1–3.3)
LYMPHOCYTES # BLD AUTO: 19.5 % — SIGNIFICANT CHANGE UP (ref 13–44)
MAGNESIUM SERPL-MCNC: 5.7 MG/DL — HIGH (ref 1.6–2.6)
MAGNESIUM SERPL-MCNC: 6.1 MG/DL — HIGH (ref 1.6–2.6)
MAGNESIUM SERPL-MCNC: 7.2 MG/DL — CRITICAL HIGH (ref 1.6–2.6)
MCHC RBC-ENTMCNC: 28.5 PG — SIGNIFICANT CHANGE UP (ref 27–34)
MCHC RBC-ENTMCNC: 33.4 GM/DL — SIGNIFICANT CHANGE UP (ref 32–36)
MCV RBC AUTO: 85.2 FL — SIGNIFICANT CHANGE UP (ref 80–100)
MONOCYTES # BLD AUTO: 0.38 K/UL — SIGNIFICANT CHANGE UP (ref 0–0.9)
MONOCYTES NFR BLD AUTO: 4.2 % — SIGNIFICANT CHANGE UP (ref 2–14)
NEUTROPHILS # BLD AUTO: 6.67 K/UL — SIGNIFICANT CHANGE UP (ref 1.8–7.4)
NEUTROPHILS NFR BLD AUTO: 73.2 % — SIGNIFICANT CHANGE UP (ref 43–77)
NRBC # BLD: 0 /100 WBCS — SIGNIFICANT CHANGE UP (ref 0–0)
NRBC # FLD: 0 K/UL — SIGNIFICANT CHANGE UP (ref 0–0)
PLATELET # BLD AUTO: 318 K/UL — SIGNIFICANT CHANGE UP (ref 150–400)
POTASSIUM SERPL-MCNC: 3.4 MMOL/L — LOW (ref 3.5–5.3)
POTASSIUM SERPL-SCNC: 3.4 MMOL/L — LOW (ref 3.5–5.3)
PROT SERPL-MCNC: 7.8 G/DL — SIGNIFICANT CHANGE UP (ref 6–8.3)
PROTHROM AB SERPL-ACNC: 10 SEC — LOW (ref 10.5–13.4)
RBC # BLD: 4.53 M/UL — SIGNIFICANT CHANGE UP (ref 3.8–5.2)
RBC # FLD: 15.7 % — HIGH (ref 10.3–14.5)
SARS-COV-2 RNA SPEC QL NAA+PROBE: SIGNIFICANT CHANGE UP
SODIUM SERPL-SCNC: 138 MMOL/L — SIGNIFICANT CHANGE UP (ref 135–145)
URATE SERPL-MCNC: 4.8 MG/DL — SIGNIFICANT CHANGE UP (ref 2.5–7)
WBC # BLD: 9.11 K/UL — SIGNIFICANT CHANGE UP (ref 3.8–10.5)
WBC # FLD AUTO: 9.11 K/UL — SIGNIFICANT CHANGE UP (ref 3.8–10.5)

## 2022-11-18 RX ORDER — DIPHENHYDRAMINE HCL 50 MG
25 CAPSULE ORAL EVERY 6 HOURS
Refills: 0 | Status: DISCONTINUED | OUTPATIENT
Start: 2022-11-18 | End: 2022-11-20

## 2022-11-18 RX ORDER — SIMETHICONE 80 MG/1
80 TABLET, CHEWABLE ORAL EVERY 4 HOURS
Refills: 0 | Status: DISCONTINUED | OUTPATIENT
Start: 2022-11-18 | End: 2022-11-20

## 2022-11-18 RX ORDER — OXYCODONE HYDROCHLORIDE 5 MG/1
5 TABLET ORAL
Refills: 0 | Status: DISCONTINUED | OUTPATIENT
Start: 2022-11-18 | End: 2022-11-20

## 2022-11-18 RX ORDER — BENZOCAINE 10 %
1 GEL (GRAM) MUCOUS MEMBRANE EVERY 6 HOURS
Refills: 0 | Status: DISCONTINUED | OUTPATIENT
Start: 2022-11-18 | End: 2022-11-20

## 2022-11-18 RX ORDER — TETANUS TOXOID, REDUCED DIPHTHERIA TOXOID AND ACELLULAR PERTUSSIS VACCINE, ADSORBED 5; 2.5; 8; 8; 2.5 [IU]/.5ML; [IU]/.5ML; UG/.5ML; UG/.5ML; UG/.5ML
0.5 SUSPENSION INTRAMUSCULAR ONCE
Refills: 0 | Status: DISCONTINUED | OUTPATIENT
Start: 2022-11-18 | End: 2022-11-20

## 2022-11-18 RX ORDER — AER TRAVELER 0.5 G/1
1 SOLUTION RECTAL; TOPICAL EVERY 4 HOURS
Refills: 0 | Status: DISCONTINUED | OUTPATIENT
Start: 2022-11-18 | End: 2022-11-20

## 2022-11-18 RX ORDER — OXYCODONE HYDROCHLORIDE 5 MG/1
5 TABLET ORAL ONCE
Refills: 0 | Status: DISCONTINUED | OUTPATIENT
Start: 2022-11-18 | End: 2022-11-20

## 2022-11-18 RX ORDER — HEPARIN SODIUM 5000 [USP'U]/ML
5000 INJECTION INTRAVENOUS; SUBCUTANEOUS EVERY 12 HOURS
Refills: 0 | Status: DISCONTINUED | OUTPATIENT
Start: 2022-11-18 | End: 2022-11-20

## 2022-11-18 RX ORDER — PRAMOXINE HYDROCHLORIDE 150 MG/15G
1 AEROSOL, FOAM RECTAL EVERY 4 HOURS
Refills: 0 | Status: DISCONTINUED | OUTPATIENT
Start: 2022-11-18 | End: 2022-11-20

## 2022-11-18 RX ORDER — HYDROCORTISONE 1 %
1 OINTMENT (GRAM) TOPICAL EVERY 6 HOURS
Refills: 0 | Status: DISCONTINUED | OUTPATIENT
Start: 2022-11-18 | End: 2022-11-20

## 2022-11-18 RX ORDER — ACETAMINOPHEN 500 MG
1000 TABLET ORAL ONCE
Refills: 0 | Status: COMPLETED | OUTPATIENT
Start: 2022-11-18 | End: 2022-11-18

## 2022-11-18 RX ORDER — IBUPROFEN 200 MG
600 TABLET ORAL EVERY 6 HOURS
Refills: 0 | Status: DISCONTINUED | OUTPATIENT
Start: 2022-11-18 | End: 2022-11-20

## 2022-11-18 RX ORDER — LANOLIN
1 OINTMENT (GRAM) TOPICAL EVERY 6 HOURS
Refills: 0 | Status: DISCONTINUED | OUTPATIENT
Start: 2022-11-18 | End: 2022-11-20

## 2022-11-18 RX ORDER — SODIUM CHLORIDE 9 MG/ML
1000 INJECTION, SOLUTION INTRAVENOUS
Refills: 0 | Status: DISCONTINUED | OUTPATIENT
Start: 2022-11-18 | End: 2022-11-20

## 2022-11-18 RX ORDER — MAGNESIUM HYDROXIDE 400 MG/1
30 TABLET, CHEWABLE ORAL
Refills: 0 | Status: DISCONTINUED | OUTPATIENT
Start: 2022-11-18 | End: 2022-11-20

## 2022-11-18 RX ORDER — IBUPROFEN 200 MG
600 TABLET ORAL EVERY 6 HOURS
Refills: 0 | Status: COMPLETED | OUTPATIENT
Start: 2022-11-18 | End: 2023-10-17

## 2022-11-18 RX ORDER — DIBUCAINE 1 %
1 OINTMENT (GRAM) RECTAL EVERY 6 HOURS
Refills: 0 | Status: DISCONTINUED | OUTPATIENT
Start: 2022-11-18 | End: 2022-11-20

## 2022-11-18 RX ADMIN — Medication 50 GM/HR: at 00:52

## 2022-11-18 RX ADMIN — Medication 1000 MILLIGRAM(S): at 03:12

## 2022-11-18 RX ADMIN — Medication 400 MILLIGRAM(S): at 02:24

## 2022-11-18 RX ADMIN — Medication 30 MILLIGRAM(S): at 22:42

## 2022-11-18 RX ADMIN — Medication 50 GM/HR: at 11:02

## 2022-11-18 RX ADMIN — Medication 600 MILLIGRAM(S): at 16:30

## 2022-11-18 RX ADMIN — HEPARIN SODIUM 5000 UNIT(S): 5000 INJECTION INTRAVENOUS; SUBCUTANEOUS at 18:16

## 2022-11-18 RX ADMIN — Medication 600 MILLIGRAM(S): at 23:16

## 2022-11-18 RX ADMIN — Medication 50 GM/HR: at 08:35

## 2022-11-18 RX ADMIN — Medication 30 MILLIGRAM(S): at 00:51

## 2022-11-18 RX ADMIN — Medication 1 TABLET(S): at 15:32

## 2022-11-18 RX ADMIN — Medication 600 MILLIGRAM(S): at 15:33

## 2022-11-18 RX ADMIN — SODIUM CHLORIDE 50 MILLILITER(S): 9 INJECTION, SOLUTION INTRAVENOUS at 11:03

## 2022-11-18 RX ADMIN — HEPARIN SODIUM 5000 UNIT(S): 5000 INJECTION INTRAVENOUS; SUBCUTANEOUS at 05:44

## 2022-11-18 NOTE — PROVIDER CONTACT NOTE (OTHER) - BACKGROUND
Patient was a PP readmit for PEC with SF with severe BPs at home and in hospital. patient was started on a magnesium drip 11/17 at 2248 and was taken off 11/18 at 1812.
Patient was a NSD from 11/14. Patient was a PP readmit for PEC with SF with severe BPs at home and in hospital. patient was started on a magnesium drip 11/17 at 2248.

## 2022-11-18 NOTE — PROVIDER CONTACT NOTE (OTHER) - ASSESSMENT
Patient mag level was 7.2 at 1700. The magnesium was stopped by dayshift. Patient stated she was "feeling sleepy before the magnesium was stopped". Patient is now feeling more awake.
Patients vitals as charted. Patient denies any  headache, visual disturbances, N/V, nor epigastric pain. Patient is lying in bed.

## 2022-11-18 NOTE — PROVIDER CONTACT NOTE (OTHER) - SITUATION
Patients 2200 BP was elevated
Patient was on a magnesium drip due that was paused and due to be restarted at 2020.

## 2022-11-18 NOTE — PATIENT PROFILE ADULT - NSPROMEDSADMININFO_GEN_A_NUR
Medication/Dose: levothyroxine 25 MCG   Patient last seen by PCP: 9-  Next office visit with PCP: none scheduled    Last Lab:10-1-2020    Above information requires contacting patient: No    Prescription does not require PDMP check    Sent to provider to approve or deny       no concerns

## 2022-11-18 NOTE — PATIENT PROFILE ADULT - DO YOU FEEL LIKE HURTING YOURSELF OR OTHERS?
Pre-Excision Curettage Text (Leave Blank If You Do Not Want): Prior to drawing the surgical margin the visible lesion was removed with electrodesiccation and curettage to clearly define the lesion size. no

## 2022-11-18 NOTE — PATIENT PROFILE ADULT - FALL HARM RISK - UNIVERSAL INTERVENTIONS
Bed in lowest position, wheels locked, appropriate side rails in place/Call bell, personal items and telephone in reach/Instruct patient to call for assistance before getting out of bed or chair/Non-slip footwear when patient is out of bed/[place] to call system/Physically safe environment - no spills, clutter or unnecessary equipment/Purposeful Proactive Rounding/Room/bathroom lighting operational, light cord in reach

## 2022-11-18 NOTE — CHART NOTE - NSCHARTNOTEFT_GEN_A_CORE
Late entry due to patient care    Notified by RN of elevated magnesium serum result of 7.2. Magnesium sulfate paused x2 hours. Patient evaluated at bedside. Patient states she previously felt as if her eyes were very heavy and had difficulty focusing, but symptoms are now resolved now that magnesium is being held. Denies CP, SOB, dizziness, lightheadedness, abdominal pain. Lungs clear b/l on exam. B/l Bicep reflexes +2. Plan for restarting mag to be determined by night team.    d/w Dr. Esparza PGY-3  Kamilah Esparza [facility]

## 2022-11-18 NOTE — PROVIDER CONTACT NOTE (OTHER) - ACTION/TREATMENT ORDERED:
MD called about restarting the magnesium. As per MD Morris magnesium does not need to be restarted and is complete.
As per MD Morris give procardia 30 due at 0000 now.

## 2022-11-18 NOTE — PROVIDER CONTACT NOTE (OTHER) - RECOMMENDATIONS
As per MD Morris give procardia 30 due at 0000 now.
MD called about restarting the magnesium. As per MD Morris magnesium does not need to be restarted and is complete

## 2022-11-19 ENCOUNTER — TRANSCRIPTION ENCOUNTER (OUTPATIENT)
Age: 27
End: 2022-11-19

## 2022-11-19 LAB
ALBUMIN SERPL ELPH-MCNC: 3.6 G/DL — SIGNIFICANT CHANGE UP (ref 3.3–5)
ALP SERPL-CCNC: 156 U/L — HIGH (ref 40–120)
ALT FLD-CCNC: 30 U/L — SIGNIFICANT CHANGE UP (ref 4–33)
ANION GAP SERPL CALC-SCNC: 13 MMOL/L — SIGNIFICANT CHANGE UP (ref 7–14)
APTT BLD: 28.6 SEC — SIGNIFICANT CHANGE UP (ref 27–36.3)
AST SERPL-CCNC: 21 U/L — SIGNIFICANT CHANGE UP (ref 4–32)
BASOPHILS # BLD AUTO: 0.02 K/UL — SIGNIFICANT CHANGE UP (ref 0–0.2)
BASOPHILS NFR BLD AUTO: 0.3 % — SIGNIFICANT CHANGE UP (ref 0–2)
BILIRUB SERPL-MCNC: 0.2 MG/DL — SIGNIFICANT CHANGE UP (ref 0.2–1.2)
BUN SERPL-MCNC: 8 MG/DL — SIGNIFICANT CHANGE UP (ref 7–23)
CALCIUM SERPL-MCNC: 7.5 MG/DL — LOW (ref 8.4–10.5)
CHLORIDE SERPL-SCNC: 106 MMOL/L — SIGNIFICANT CHANGE UP (ref 98–107)
CO2 SERPL-SCNC: 21 MMOL/L — LOW (ref 22–31)
CREAT SERPL-MCNC: 0.51 MG/DL — SIGNIFICANT CHANGE UP (ref 0.5–1.3)
EGFR: 130 ML/MIN/1.73M2 — SIGNIFICANT CHANGE UP
EOSINOPHIL # BLD AUTO: 0.27 K/UL — SIGNIFICANT CHANGE UP (ref 0–0.5)
EOSINOPHIL NFR BLD AUTO: 3.5 % — SIGNIFICANT CHANGE UP (ref 0–6)
FIBRINOGEN PPP-MCNC: 457 MG/DL — SIGNIFICANT CHANGE UP (ref 200–465)
GLUCOSE SERPL-MCNC: 76 MG/DL — SIGNIFICANT CHANGE UP (ref 70–99)
HCT VFR BLD CALC: 35.6 % — SIGNIFICANT CHANGE UP (ref 34.5–45)
HGB BLD-MCNC: 11.8 G/DL — SIGNIFICANT CHANGE UP (ref 11.5–15.5)
IANC: 4.77 K/UL — SIGNIFICANT CHANGE UP (ref 1.8–7.4)
IMM GRANULOCYTES NFR BLD AUTO: 0.9 % — SIGNIFICANT CHANGE UP (ref 0–0.9)
LDH SERPL L TO P-CCNC: 253 U/L — HIGH (ref 135–225)
LYMPHOCYTES # BLD AUTO: 2.21 K/UL — SIGNIFICANT CHANGE UP (ref 1–3.3)
LYMPHOCYTES # BLD AUTO: 28.4 % — SIGNIFICANT CHANGE UP (ref 13–44)
MCHC RBC-ENTMCNC: 28.4 PG — SIGNIFICANT CHANGE UP (ref 27–34)
MCHC RBC-ENTMCNC: 33.1 GM/DL — SIGNIFICANT CHANGE UP (ref 32–36)
MCV RBC AUTO: 85.6 FL — SIGNIFICANT CHANGE UP (ref 80–100)
MONOCYTES # BLD AUTO: 0.44 K/UL — SIGNIFICANT CHANGE UP (ref 0–0.9)
MONOCYTES NFR BLD AUTO: 5.7 % — SIGNIFICANT CHANGE UP (ref 2–14)
NEUTROPHILS # BLD AUTO: 4.77 K/UL — SIGNIFICANT CHANGE UP (ref 1.8–7.4)
NEUTROPHILS NFR BLD AUTO: 61.2 % — SIGNIFICANT CHANGE UP (ref 43–77)
NRBC # BLD: 0 /100 WBCS — SIGNIFICANT CHANGE UP (ref 0–0)
NRBC # FLD: 0 K/UL — SIGNIFICANT CHANGE UP (ref 0–0)
PLATELET # BLD AUTO: 300 K/UL — SIGNIFICANT CHANGE UP (ref 150–400)
POTASSIUM SERPL-MCNC: 3.5 MMOL/L — SIGNIFICANT CHANGE UP (ref 3.5–5.3)
POTASSIUM SERPL-SCNC: 3.5 MMOL/L — SIGNIFICANT CHANGE UP (ref 3.5–5.3)
PROT SERPL-MCNC: 7.1 G/DL — SIGNIFICANT CHANGE UP (ref 6–8.3)
RBC # BLD: 4.16 M/UL — SIGNIFICANT CHANGE UP (ref 3.8–5.2)
RBC # FLD: 15.7 % — HIGH (ref 10.3–14.5)
SODIUM SERPL-SCNC: 140 MMOL/L — SIGNIFICANT CHANGE UP (ref 135–145)
URATE SERPL-MCNC: 5.4 MG/DL — SIGNIFICANT CHANGE UP (ref 2.5–7)
WBC # BLD: 7.78 K/UL — SIGNIFICANT CHANGE UP (ref 3.8–10.5)
WBC # FLD AUTO: 7.78 K/UL — SIGNIFICANT CHANGE UP (ref 3.8–10.5)

## 2022-11-19 RX ORDER — NIFEDIPINE 30 MG
60 TABLET, EXTENDED RELEASE 24 HR ORAL DAILY
Refills: 0 | Status: DISCONTINUED | OUTPATIENT
Start: 2022-11-19 | End: 2022-11-20

## 2022-11-19 RX ORDER — NIFEDIPINE 30 MG
1 TABLET, EXTENDED RELEASE 24 HR ORAL
Qty: 30 | Refills: 0
Start: 2022-11-19 | End: 2022-12-18

## 2022-11-19 RX ORDER — NIFEDIPINE 30 MG
30 TABLET, EXTENDED RELEASE 24 HR ORAL ONCE
Refills: 0 | Status: COMPLETED | OUTPATIENT
Start: 2022-11-19 | End: 2022-11-19

## 2022-11-19 RX ADMIN — Medication 1 TABLET(S): at 13:49

## 2022-11-19 RX ADMIN — HEPARIN SODIUM 5000 UNIT(S): 5000 INJECTION INTRAVENOUS; SUBCUTANEOUS at 17:17

## 2022-11-19 RX ADMIN — HEPARIN SODIUM 5000 UNIT(S): 5000 INJECTION INTRAVENOUS; SUBCUTANEOUS at 05:10

## 2022-11-19 RX ADMIN — Medication 600 MILLIGRAM(S): at 00:05

## 2022-11-19 RX ADMIN — Medication 600 MILLIGRAM(S): at 05:55

## 2022-11-19 RX ADMIN — Medication 30 MILLIGRAM(S): at 13:49

## 2022-11-19 RX ADMIN — Medication 600 MILLIGRAM(S): at 17:09

## 2022-11-19 RX ADMIN — Medication 600 MILLIGRAM(S): at 15:39

## 2022-11-19 RX ADMIN — Medication 600 MILLIGRAM(S): at 05:10

## 2022-11-19 RX ADMIN — Medication 600 MILLIGRAM(S): at 23:34

## 2022-11-19 NOTE — DISCHARGE NOTE ANTEPARTUM - HOSPITAL COURSE
27 y.o.  initially presenting PPD#3 from  with elevated BPs from home found to meet criteria for severe preeclampsia. HELLP labs significant for AST/ALT 34/37, otherwise within normal limits. Patient was given magnesium x 24 hours for seizure prophylaxis, required Procardia 10/10 IR for immediate BP control and was started on Procardia 30XL for maintenance of BP. On HD#3 patient had improvement of BPs and was asymptomatic. She was discharged with precautions, prescription for Procardia 30XL was sent, and to follow up closely with OB.

## 2022-11-19 NOTE — DISCHARGE NOTE ANTEPARTUM - PATIENT PORTAL LINK FT
You can access the FollowMyHealth Patient Portal offered by [facility] by registering at the following website: [email or web address]. By joining [employer]’s FollowMyHealth portal, you will also be able to view your health information using other applications (apps) compatible with our system.

## 2022-11-19 NOTE — DISCHARGE NOTE ANTEPARTUM - MEDICATION SUMMARY - MEDICATIONS TO TAKE
I will START or STAY ON the medications listed below when I get home from the hospital:    acetaminophen 325 mg oral tablet  -- 3 tab(s) by mouth every 8 hours, As Needed  -- Indication: For Pain    ibuprofen 600 mg oral tablet  -- 1 tab(s) by mouth every 6 hours, As Needed  -- Indication: For Pain    dibucaine 1% topical ointment  -- 1 application on skin every 6 hours, As needed, Perineal discomfort  -- Indication: For Perineal pain    witch hazel 50% topical pad  -- 1 application on skin every 4 hours, As needed, Perineal discomfort  -- Indication: For Perineal pain   I will START or STAY ON the medications listed below when I get home from the hospital:    acetaminophen 325 mg oral tablet  -- 3 tab(s) by mouth every 8 hours, As Needed  -- Indication: For Pain    ibuprofen 600 mg oral tablet  -- 1 tab(s) by mouth every 6 hours, As Needed  -- Indication: For oain    NIFEdipine 60 mg oral tablet, extended release  -- 1 tab(s) by mouth once a day  -- Indication: For HTN    witch hazel 50% topical pad  -- 1 application on skin every 4 hours, As needed, Perineal discomfort  -- Indication: For Perineal pain    dibucaine 1% topical ointment  -- 1 application on skin every 6 hours, As needed, Perineal discomfort  -- Indication: For Pain

## 2022-11-19 NOTE — DISCHARGE NOTE ANTEPARTUM - NS MD DC FALL RISK RISK
For information on Fall & Injury Prevention, visit: [email or web address] OR  [email or web address] OR  https://www.cdc.gov/steadi/patient.html

## 2022-11-19 NOTE — DISCHARGE NOTE ANTEPARTUM - CARE PLAN
Principal Discharge DX:	Severe preeclampsia  Assessment and plan of treatment:	Patient presented with elevated blood pressures meeting criteria for severe pre-eclampsia. She was placed on magnesium for seizure prophylaxis and Procardia 30XL for blood pressure control. On HD#3 BPs were well controlled and she denied any symptoms of SOB, HA, vision changes, RUQ pain. The patient was able to void, tolerated a regular diet, and ambulated on her own.  The patient was discharged afebrile, with stable vital signs, and appropriate pain control. She was sent home with prescription for Procardia and instructions to have close follow up with OB.   1

## 2022-11-19 NOTE — DISCHARGE NOTE ANTEPARTUM - PLAN OF CARE
Patient presented with elevated blood pressures meeting criteria for severe pre-eclampsia. She was placed on magnesium for seizure prophylaxis and Procardia 30XL for blood pressure control. On HD#3 BPs were well controlled and she denied any symptoms of SOB, HA, vision changes, RUQ pain. The patient was able to void, tolerated a regular diet, and ambulated on her own.  The patient was discharged afebrile, with stable vital signs, and appropriate pain control. She was sent home with prescription for Procardia and instructions to have close follow up with OB.

## 2022-11-19 NOTE — DISCHARGE NOTE ANTEPARTUM - ADDITIONAL INSTRUCTIONS
Continue to take your blood pressure at least twice a day, and take your medications as prescribed.  Call your OB if your pressures are greater than 150/100, or if you experience severe or persistent headaches, visual changes, persistent nausea/vomiting, trouble breathing, chest pain, or severe abdominal pain.

## 2022-11-19 NOTE — DISCHARGE NOTE ANTEPARTUM - CARE PROVIDER_API CALL
Colleen Betancourt (DO)  Obstetrics and Gynecology  [address] [place] [address]  Phone: [phone]  Established Patient  Follow Up Time: 1 week

## 2022-11-20 VITALS — HEART RATE: 95 BPM | DIASTOLIC BLOOD PRESSURE: 98 MMHG | SYSTOLIC BLOOD PRESSURE: 134 MMHG

## 2022-11-20 RX ORDER — NIFEDIPINE 30 MG
1 TABLET, EXTENDED RELEASE 24 HR ORAL
Qty: 0 | Refills: 0 | DISCHARGE
Start: 2022-11-20

## 2022-11-20 RX ADMIN — Medication 600 MILLIGRAM(S): at 00:30

## 2022-11-20 RX ADMIN — HEPARIN SODIUM 5000 UNIT(S): 5000 INJECTION INTRAVENOUS; SUBCUTANEOUS at 06:03

## 2022-11-20 RX ADMIN — Medication 600 MILLIGRAM(S): at 09:33

## 2022-11-20 RX ADMIN — Medication 600 MILLIGRAM(S): at 08:33

## 2022-11-20 RX ADMIN — Medication 60 MILLIGRAM(S): at 06:03

## 2022-11-20 NOTE — PROGRESS NOTE ADULT - ASSESSMENT
26y/o PPD#5 from  c/b PP readmit for sPEC (BP) on Mg in stable condition. HELLP labs notable for AST/ALT 34/37->31/41. Pain is well controlled and pt is doing well.     Neuro: no active issues, s/p Mg (-)   CV: Hemodynamically stable, continue to monitor BPs closely, c/w Proc 30 for BP control. S/p Proc 10/10 ()   Pulm: Saturating well on room air, encourage incentive spirometry  GI: C/w reg diet  : voiding without issue, UOP adequate, continue to monitor I&Os  Heme: c/w HSQ and SCDs for DVT ppx  Dispo: Continue routine postpartum care    Shanell Morris PGY3
28y/o PPD#4 from  c/b PP readmit for sPEC (BP) on Mg in stable condition. HELLP labs notable for AST/ALT 34/37. Pain is well controlled and pt is doing well.     Neuro: no active issues, Mg (-) 24h  CV: Hemodynamically stable, continue to monitor BPs closely, c/w Proc 30 for BP control. S/p Proc 10/10 ()   Pulm: Saturating well on room air, encourage incentive spirometry  GI: C/w reg diet  : voiding without issue, UOP adequate, continue to monitor I&Os  Heme: c/w HSQ and SCDs for DVT ppx  Dispo: Continue routine postpartum care    Shanell Morris PGY3
28y/o PPD#6 from VAVD c/b PP readmit for sPEC (BP) on Mg in stable condition. HELLP labs notable for AST/ALT 34/37->31/41. Pain is well controlled and pt is doing well.     Neuro: no active issues, s/p Mg (11/17-11/18)   CV: Hemodynamically stable, continue to monitor BPs closely, c/w Proc 60 for BP control(increased on 11/19) S/p Proc 10/10 (11/17)   Pulm: Saturating well on room air, encourage incentive spirometry  GI: C/w reg diet  : voiding without issue, UOP adequate, continue to monitor I&Os  Heme: c/w HSQ and SCDs for DVT ppx    Dispo: Continue routine postpartum care, discharge planning    Amada Carter, PGY-3

## 2022-11-20 NOTE — PROGRESS NOTE ADULT - SUBJECTIVE AND OBJECTIVE BOX
OB Progress Note: VAVD PPD#5    S: 26yo PPD#5 s/p VAVD. Patient feels well. Pain is well controlled. She is tolerating a regular diet and passing flatus. She is voiding spontaneously, and ambulating without difficulty. She is breastfeeding. Denies CP/SOB. Denies lightheadedness/dizziness. Denies N/V.    O:  Vitals:  Vital Signs Last 24 Hrs  T(C): 36.4 (19 Nov 2022 01:33), Max: 37.1 (18 Nov 2022 19:26)  T(F): 97.5 (19 Nov 2022 01:33), Max: 98.7 (18 Nov 2022 19:26)  HR: 94 (19 Nov 2022 01:33) (80 - 107)  BP: 128/83 (19 Nov 2022 01:33) (112/66 - 154/98)  BP(mean): 90 (18 Nov 2022 10:30) (75 - 90)  RR: 18 (19 Nov 2022 01:33) (16 - 18)  SpO2: 100% (19 Nov 2022 01:33) (98% - 100%)    Parameters below as of 19 Nov 2022 01:33  Patient On (Oxygen Delivery Method): room air      MEDICATIONS  (STANDING):  diphtheria/tetanus/pertussis (acellular) Vaccine (Adacel) 0.5 milliLiter(s) IntraMuscular once  heparin   Injectable 5000 Unit(s) SubCutaneous every 12 hours  ibuprofen  Tablet. 600 milliGRAM(s) Oral every 6 hours  lactated ringers. 1000 milliLiter(s) (50 mL/Hr) IV Continuous <Continuous>  NIFEdipine XL 30 milliGRAM(s) Oral daily  prenatal multivitamin 1 Tablet(s) Oral daily      Labs:  Blood type: AB Positive  Rubella IgG: RPR: Negative                          12.9   9.11 >-----------< 318    ( 11-18 @ 10:53 )             38.6                        10.3<L>   9.88 >-----------< 243    ( 11-17 @ 22:26 )             31.2<L>    11-18-22 @ 10:53      138  |  101  |  4<L>  ----------------------------<  101<H>  3.4<L>   |  23  |  0.39<L>    11-17-22 @ 22:26      142  |  109<H>  |  7   ----------------------------<  96  4.2   |  24  |  0.54        Ca    7.4<L>      18 Nov 2022 10:53  Ca    8.8      17 Nov 2022 22:26  Mg     7.20<HH>     11-18  Mg     6.10<H>     11-18  Mg     5.70<H>     11-18    TPro  7.8  /  Alb  4.2  /  TBili  <0.2  /  DBili  x   /  AST  31  /  ALT  41<H>  /  AlkPhos  181<H>  11-18-22 @ 10:53  TPro  6.6  /  Alb  3.3  /  TBili  <0.2  /  DBili  x   /  AST  34<H>  /  ALT  37<H>  /  AlkPhos  143<H>  11-17-22 @ 22:26      Physical Exam:  General: NAD  Abdomen: soft, non-tender, non-distended, fundus firm  Vaginal: Lochia wnl  Extremities: No erythema/edema  
OB Progress Note: VAVD PPD#6    S: 26yo GP PPD#6 s/p VAVD. Patient feels well. She denies F/C,CP/SOB.headache, vision changes, abdominal pain. Denies lightheadedness/dizziness. Denies N/V.    O:  Vitals:  Vital Signs Last 24 Hrs  T(C): 36.7 (20 Nov 2022 05:50), Max: 37 (20 Nov 2022 01:28)  T(F): 98 (20 Nov 2022 05:50), Max: 98.6 (20 Nov 2022 01:28)  HR: 99 (20 Nov 2022 05:50) (80 - 120)  BP: 131/86 (20 Nov 2022 05:50) (130/90 - 157/97)  BP(mean): --  RR: 17 (20 Nov 2022 05:50) (15 - 20)  SpO2: 100% (20 Nov 2022 05:50) (98% - 100%)    Parameters below as of 20 Nov 2022 05:50  Patient On (Oxygen Delivery Method): room air        MEDICATIONS  (STANDING):  diphtheria/tetanus/pertussis (acellular) Vaccine (Adacel) 0.5 milliLiter(s) IntraMuscular once  heparin   Injectable 5000 Unit(s) SubCutaneous every 12 hours  ibuprofen  Tablet. 600 milliGRAM(s) Oral every 6 hours  lactated ringers. 1000 milliLiter(s) (50 mL/Hr) IV Continuous <Continuous>  NIFEdipine XL 60 milliGRAM(s) Oral daily  prenatal multivitamin 1 Tablet(s) Oral daily      Labs:  Blood type: AB Positive  Rubella IgG: RPR: Negative                          11.8   7.78 >-----------< 300    ( 11-19 @ 05:06 )             35.6                        12.9   9.11 >-----------< 318    ( 11-18 @ 10:53 )             38.6                        10.3<L>   9.88 >-----------< 243    ( 11-17 @ 22:26 )             31.2<L>    11-19-22 @ 05:06      140  |  106  |  8   ----------------------------<  76  3.5   |  21<L>  |  0.51    11-18-22 @ 10:53      138  |  101  |  4<L>  ----------------------------<  101<H>  3.4<L>   |  23  |  0.39<L>    11-17-22 @ 22:26      142  |  109<H>  |  7   ----------------------------<  96  4.2   |  24  |  0.54        Ca    7.5<L>      19 Nov 2022 05:06  Ca    7.4<L>      18 Nov 2022 10:53  Ca    8.8      17 Nov 2022 22:26  Mg     7.20<HH>     11-18  Mg     6.10<H>     11-18  Mg     5.70<H>     11-18    TPro  7.1  /  Alb  3.6  /  TBili  0.2  /  DBili  x   /  AST  21  /  ALT  30  /  AlkPhos  156<H>  11-19-22 @ 05:06  TPro  7.8  /  Alb  4.2  /  TBili  <0.2  /  DBili  x   /  AST  31  /  ALT  41<H>  /  AlkPhos  181<H>  11-18-22 @ 10:53  TPro  6.6  /  Alb  3.3  /  TBili  <0.2  /  DBili  x   /  AST  34<H>  /  ALT  37<H>  /  AlkPhos  143<H>  11-17-22 @ 22:26          Physical Exam:  General: NAD  Abdomen: Soft, non-tender, non-distended  Vaginal: Lochia wnl  Extremities: No erythema/edema    
OB Progress Note:  PPD#4    S: 28yo PPD#4 s/p . Patient feels well. Pain is well controlled. She is tolerating a regular diet and passing flatus. She is voiding spontaneously, and ambulating without difficulty. Endorses light vaginal bleeding, soaking less than 1 pad/hour. Denies CP/SOB. Denies lightheadedness/dizziness. Denies N/V.    O:  Vitals:   Vital Signs Last 24 Hrs  T(C): --  T(F): --  HR: 72 (2022 02:48) (62 - 97)  BP: 125/76 (2022 03:40) (108/62 - 134/86)  BP(mean): 88 (2022 03:40) (84 - 91)  RR: --  SpO2: 100% (2022 03:40) (99% - 100%)        MEDICATIONS  (STANDING):  heparin   Injectable 5000 Unit(s) SubCutaneous every 12 hours  lactated ringers. 1000 milliLiter(s) (50 mL/Hr) IV Continuous <Continuous>  magnesium sulfate Infusion 2 Gm/Hr (50 mL/Hr) IV Continuous <Continuous>  NIFEdipine XL 30 milliGRAM(s) Oral daily    Labs:  Blood type: AB Positive  Rubella IgG: RPR: Negative                          10.3<L>   9.88 >-----------< 243    (  @ 22:26 )             31.2<L>                        10.4<L>   15.75<H> >-----------< 169    ( 11-15 @ 05:55 )             31.2<L>    22 @ 22:26      142  |  109<H>  |  7   ----------------------------<  96  4.2   |  24  |  0.54        Ca    8.8      2022 22:26    TPro  6.6  /  Alb  3.3  /  TBili  <0.2  /  DBili  x   /  AST  34<H>  /  ALT  37<H>  /  AlkPhos  143<H>  22 @ 22:26      Physical Exam:  General: NAD  Heart: extremities well-perfused  Lungs: breathing comfortably  Abdomen: soft, non-tender, non-distended, fundus firm  Vaginal: lochia wnl  Extremities: No calf tenderness or erythema

## 2023-01-01 ENCOUNTER — EMERGENCY (EMERGENCY)
Facility: HOSPITAL | Age: 28
LOS: 1 days | Discharge: HOME | End: 2023-01-01
Attending: EMERGENCY MEDICINE | Admitting: EMERGENCY MEDICINE
Payer: COMMERCIAL

## 2023-01-01 VITALS — HEART RATE: 96 BPM

## 2023-01-01 VITALS
TEMPERATURE: 98 F | DIASTOLIC BLOOD PRESSURE: 86 MMHG | HEART RATE: 144 BPM | OXYGEN SATURATION: 100 % | HEIGHT: 61 IN | RESPIRATION RATE: 20 BRPM | SYSTOLIC BLOOD PRESSURE: 138 MMHG

## 2023-01-01 LAB
ALBUMIN SERPL ELPH-MCNC: 5 G/DL — SIGNIFICANT CHANGE UP (ref 3.3–5)
ALP SERPL-CCNC: 154 U/L — HIGH (ref 40–120)
ALT FLD-CCNC: 83 U/L — HIGH (ref 4–33)
ANION GAP SERPL CALC-SCNC: 17 MMOL/L — HIGH (ref 7–14)
AST SERPL-CCNC: 35 U/L — HIGH (ref 4–32)
BASOPHILS # BLD AUTO: 0.02 K/UL — SIGNIFICANT CHANGE UP (ref 0–0.2)
BASOPHILS NFR BLD AUTO: 0.2 % — SIGNIFICANT CHANGE UP (ref 0–2)
BILIRUB SERPL-MCNC: 0.4 MG/DL — SIGNIFICANT CHANGE UP (ref 0.2–1.2)
BLD GP AB SCN SERPL QL: NEGATIVE — SIGNIFICANT CHANGE UP
BUN SERPL-MCNC: 8 MG/DL — SIGNIFICANT CHANGE UP (ref 7–23)
CALCIUM SERPL-MCNC: 10.3 MG/DL — SIGNIFICANT CHANGE UP (ref 8.4–10.5)
CHLORIDE SERPL-SCNC: 101 MMOL/L — SIGNIFICANT CHANGE UP (ref 98–107)
CO2 SERPL-SCNC: 22 MMOL/L — SIGNIFICANT CHANGE UP (ref 22–31)
CREAT SERPL-MCNC: 0.5 MG/DL — SIGNIFICANT CHANGE UP (ref 0.5–1.3)
D DIMER BLD IA.RAPID-MCNC: 957 NG/ML DDU — HIGH
EGFR: 131 ML/MIN/1.73M2 — SIGNIFICANT CHANGE UP
EOSINOPHIL # BLD AUTO: 0.14 K/UL — SIGNIFICANT CHANGE UP (ref 0–0.5)
EOSINOPHIL NFR BLD AUTO: 1.5 % — SIGNIFICANT CHANGE UP (ref 0–6)
FLUAV AG NPH QL: SIGNIFICANT CHANGE UP
FLUBV AG NPH QL: SIGNIFICANT CHANGE UP
GLUCOSE SERPL-MCNC: 125 MG/DL — HIGH (ref 70–99)
HCG SERPL-ACNC: <5 MIU/ML — SIGNIFICANT CHANGE UP
HCT VFR BLD CALC: 47.5 % — HIGH (ref 34.5–45)
HGB BLD-MCNC: 15.5 G/DL — SIGNIFICANT CHANGE UP (ref 11.5–15.5)
IANC: 6.35 K/UL — SIGNIFICANT CHANGE UP (ref 1.8–7.4)
IMM GRANULOCYTES NFR BLD AUTO: 0.4 % — SIGNIFICANT CHANGE UP (ref 0–0.9)
LYMPHOCYTES # BLD AUTO: 2.61 K/UL — SIGNIFICANT CHANGE UP (ref 1–3.3)
LYMPHOCYTES # BLD AUTO: 27.1 % — SIGNIFICANT CHANGE UP (ref 13–44)
MAGNESIUM SERPL-MCNC: 1.9 MG/DL — SIGNIFICANT CHANGE UP (ref 1.6–2.6)
MCHC RBC-ENTMCNC: 27.5 PG — SIGNIFICANT CHANGE UP (ref 27–34)
MCHC RBC-ENTMCNC: 32.6 GM/DL — SIGNIFICANT CHANGE UP (ref 32–36)
MCV RBC AUTO: 84.4 FL — SIGNIFICANT CHANGE UP (ref 80–100)
MONOCYTES # BLD AUTO: 0.47 K/UL — SIGNIFICANT CHANGE UP (ref 0–0.9)
MONOCYTES NFR BLD AUTO: 4.9 % — SIGNIFICANT CHANGE UP (ref 2–14)
NEUTROPHILS # BLD AUTO: 6.35 K/UL — SIGNIFICANT CHANGE UP (ref 1.8–7.4)
NEUTROPHILS NFR BLD AUTO: 65.9 % — SIGNIFICANT CHANGE UP (ref 43–77)
NRBC # BLD: 0 /100 WBCS — SIGNIFICANT CHANGE UP (ref 0–0)
NRBC # FLD: 0 K/UL — SIGNIFICANT CHANGE UP (ref 0–0)
PHOSPHATE SERPL-MCNC: 4.2 MG/DL — SIGNIFICANT CHANGE UP (ref 2.5–4.5)
PLATELET # BLD AUTO: 319 K/UL — SIGNIFICANT CHANGE UP (ref 150–400)
POTASSIUM SERPL-MCNC: 3.5 MMOL/L — SIGNIFICANT CHANGE UP (ref 3.5–5.3)
POTASSIUM SERPL-SCNC: 3.5 MMOL/L — SIGNIFICANT CHANGE UP (ref 3.5–5.3)
PROT SERPL-MCNC: 8.4 G/DL — HIGH (ref 6–8.3)
RBC # BLD: 5.63 M/UL — HIGH (ref 3.8–5.2)
RBC # FLD: 13.7 % — SIGNIFICANT CHANGE UP (ref 10.3–14.5)
RH IG SCN BLD-IMP: POSITIVE — SIGNIFICANT CHANGE UP
RSV RNA NPH QL NAA+NON-PROBE: SIGNIFICANT CHANGE UP
SARS-COV-2 RNA SPEC QL NAA+PROBE: SIGNIFICANT CHANGE UP
SODIUM SERPL-SCNC: 140 MMOL/L — SIGNIFICANT CHANGE UP (ref 135–145)
TSH SERPL-MCNC: 0.6 UIU/ML — SIGNIFICANT CHANGE UP (ref 0.27–4.2)
WBC # BLD: 9.63 K/UL — SIGNIFICANT CHANGE UP (ref 3.8–10.5)
WBC # FLD AUTO: 9.63 K/UL — SIGNIFICANT CHANGE UP (ref 3.8–10.5)

## 2023-01-01 PROCEDURE — 71045 X-RAY EXAM CHEST 1 VIEW: CPT | Mod: 26

## 2023-01-01 PROCEDURE — 71275 CT ANGIOGRAPHY CHEST: CPT | Mod: 26,MA

## 2023-01-01 PROCEDURE — 99285 EMERGENCY DEPT VISIT HI MDM: CPT

## 2023-01-01 RX ORDER — ACETAMINOPHEN 500 MG
975 TABLET ORAL ONCE
Refills: 0 | Status: COMPLETED | OUTPATIENT
Start: 2023-01-01 | End: 2023-01-01

## 2023-01-01 RX ORDER — SODIUM CHLORIDE 9 MG/ML
2000 INJECTION INTRAMUSCULAR; INTRAVENOUS; SUBCUTANEOUS ONCE
Refills: 0 | Status: COMPLETED | OUTPATIENT
Start: 2023-01-01 | End: 2023-01-01

## 2023-01-01 RX ADMIN — Medication 975 MILLIGRAM(S): at 03:33

## 2023-01-01 RX ADMIN — SODIUM CHLORIDE 2000 MILLILITER(S): 9 INJECTION INTRAMUSCULAR; INTRAVENOUS; SUBCUTANEOUS at 03:23

## 2023-01-01 NOTE — ED PROVIDER NOTE - PATIENT PORTAL LINK FT
You can access the FollowMyHealth Patient Portal offered by [facility] by registering at the following website: [email or web address]. By joining [employer]’s FollowMyHealth portal, you will also be able to view your health information using other applications (apps) compatible with our system.

## 2023-01-01 NOTE — ED ADULT TRIAGE NOTE - CHIEF COMPLAINT QUOTE
pt 2 weeks post partum c/o hypertension. diagnosed with post partum preeclampsia pt was on htn meds, stopped 12 days ago per OB when pressures were fine. has been taking pressures 2x day. today had blurry vision, took BP, took 2x dose procardia 30 today. pt 7 weeks postpartum c/o hypertension. diagnosed with post partum preeclampsia pt was on htn meds, stopped 12 days ago per OB when pressures were fine. has been taking pressures 2x day. today had blurry vision, took BP, took 2x dose procardia 30 today.

## 2023-01-01 NOTE — ED ADULT NURSE NOTE - OBJECTIVE STATEMENT
Pt presents to  17 AO4 ambulatory complaining of fast heart rate. Pt ~7wks postpartum, endorses checking blood pressure and found it elevated at home and noticed her heart rate was fast. Pt presents with BP normotensive but tachycardic to 130. Denies any sob or diff breathing. Denies any chest pain. 20g IV placed to R AC labs drawn and sent.

## 2023-01-01 NOTE — ED PROVIDER NOTE - ATTENDING CONTRIBUTION TO CARE
27-year-old female with history of postpartum preeclampsia and gestational hypertension, currently 7 weeks postpartum, stopped her p.o. antihypertensives (Procardia) about 1 week ago and currently monitors blood pressure at home twice a day.  Presenting to ER for elevated blood pressure at home to 1 60-1 90 systolic with tachycardia, along with head burning sensation and blurry vision, both of which have now resolved.  At home patient took 30 mg x 2 of Procardia around 5 PM.  Denies any head pain, chest or abdominal pain, syncope, palpitations, fevers, hormone use, recent travel, or sick contacts.  Of note patient is currently on her menstrual period and estimates about 4-6 pads usage over the last 24 hours.    Exam  Blood pressure 138/86, heart rate 144  Alert and oriented, comfortable appearing  Lungs clear bilaterally  Abdomen soft nontender nondistended    EKG sinus tachycardia–rate 121 bpm, narrow complex    Assessment/plan  Postpartum hypertension–now greater than 6 weeks postpartum, so preeclampsia less likely.  Hypertension resolved in ER after meds at home  Tachycardia–?  Anxiety versus PE versus infection versus anemia versus hyperthyroid versus dehydration (patient reports decreased p.o. today)  Will get labs with D-dimer and TSH, viral swab, chest x-ray,   IV fluids  Will likely call OB/GYN team after studies have resulted

## 2023-01-01 NOTE — ED PROVIDER NOTE - NS ED ROS FT
GENERAL: No fever, chills  EYES: no vision changes, no discharge.   ENT: no difficulty swallowing or speaking   CARDIAC: no chest pain/pressure, SOB, lower extremity swelling  PULMONARY: no cough, SOB  GI: no abdominal pain, n/v/d  : no dysuria, no hematuria  SKIN: no rashes, no ecchymosis  NEURO: + headache, no lightheadedness  MSK: No joint pain, myalgia, weakness.

## 2023-01-01 NOTE — ED ADULT TRIAGE NOTE - CCCP TRG CHIEF CMPLNT
[Normal Growth] : growth [Normal Development] : development [None] : No known medical problems [No Elimination Concerns] : elimination [No feeding Concerns] : feeding [No Skin Concerns] : skin [Normal Sleep Pattern] : sleep [No Medications] : ~He/She~ is not on any medications [Patient] : patient [FreeTextEntry1] : rto flu shot when available hypertension

## 2023-01-01 NOTE — ED PROVIDER NOTE - OBJECTIVE STATEMENT
27-year-old female G1, P1 postpartum nearly 7 weeks with no past medical history presents ED complaining of tachycardia and high blood pressure.  Patient was admitted postpartum for preeclampsia and was sent home on Procardia but since then was titrated off with Procardia.  Patient has been checking her blood pressure twice daily since.  Patient checked blood pressure at 4 PM and noticed it was 130 systolic and 160.  Then rechecked later in the evening and it was systolic 190 and she had a elevated heart rate.  Patient then took 30 of Procardia x2.  Patient called friend who is a PA who advised her to go to the ER.  Patient states she is possibly on her menstrual cycle and has been bleeding for the last 4 days.  Denies recent illnesses, fever, nausea, vomiting, chest pain, palpitations, shortness of breath, abdominal pain, dysuria, diarrhea.

## 2023-01-01 NOTE — ED PROVIDER NOTE - CLINICAL SUMMARY MEDICAL DECISION MAKING FREE TEXT BOX
Raf Castro,  PGY-2: 27-year-old G1, P1 postpartum over 6 weeks presenting with tachycardia and concerning high blood pressure at home.  Patient tachycardic in the 120s but otherwise asymptomatic.  Patient is outside the window for postpartum preeclampsia.  Differential includes not limited to infection, hyperthyroidism, PE, dehydration.  Plan for labs, EKG, IV hydration.  Reassess

## 2023-01-01 NOTE — ED PROVIDER NOTE - PROGRESS NOTE DETAILS
Raf Castro, DO PGY-2: Elevated D-dimer, other labs nonactionable.  Mild elevation of transaminases.  EKG showing sinus tachycardia.  With elevated D-dimer and tachycardia will obtain CT PE.  Continue to monitor. Raf Castro, DO PGY-2: Spoke with OB resident, patient well outside the window for preeclampsia and has no other GYN complaint. CT showing no PE.  Patient heart rate in the 90s.  Will discharge to follow-up with PMD and OB doctor.

## 2023-01-01 NOTE — ED PROVIDER NOTE - NSFOLLOWUPINSTRUCTIONS_ED_ALL_ED_FT
1.  Please follow-up with your OB/GYN doctor and primary care doctor within the next week.    2.  Please keep yourself well-hydrated.      Tachycardia     WHAT YOU NEED TO KNOW:    Tachycardia (fast heart rate) is when your heart rate is 100 beats per minute or more at rest. It is normal for the heart rate to increase with activity or exercise and then decrease when you stop. A fast heart rate at rest may be caused by strong emotions, fever, activity, some medicines, drugs, or caffeine.  Heart Chambers         DISCHARGE INSTRUCTIONS:    Call your local emergency number (911 in the ) or have someone call if:   •You have any of the following signs of a heart attack: ?Squeezing, pressure, or pain in your chest      ?You may also have any of the following: ?Discomfort or pain in your back, neck, jaw, stomach, or arm      ?Shortness of breath      ?Nausea or vomiting      ?Lightheadedness or a sudden cold sweat        •You cannot be woken.      Call your doctor or cardiologist if:   •Your pulse is faster than you were told it should be.      •You have a fast heart rate often.      •You feel weak or dizzy.      •You have questions or concerns about your condition or care.      Medicines:   •Medicines can help control your heart rate or rhythm. You may need more than one medicine to treat your symptoms.      •Take your medicine as directed. Contact your healthcare provider if you think your medicine is not helping or if you have side effects. Tell your provider if you are allergic to any medicine. Keep a list of the medicines, vitamins, and herbs you take. Include the amounts, and when and why you take them. Bring the list or the pill bottles to follow-up visits. Carry your medicine list with you in case of an emergency.      Check your heart rate (pulse) as directed: Your healthcare provider will show you how to check your pulse, and how often to check it. Write down how fast your pulse is and if it feels regular or like it is skipping beats. Also write down the activity you were doing if your heart rate is above 100. Bring the information with you to your follow-up appointment.  How to Take a Pulse         Help prevent a fast heart rate:   •Have less caffeine. Caffeine can increase your heart rate.      •Limit or do not drink alcohol. Alcohol can increase your heart rate. Ask your healthcare provider if it is okay for you to drink any alcohol. He or she can help you set limits for the number of drinks you have in 24 hours and in a week. A drink of alcohol is 12 ounces of beer, 5 ounces of wine, or 1½ ounces of liquor.      •Do not smoke. Nicotine and other chemicals in cigarettes can cause damage to your heart. Ask your healthcare provider for information if you currently smoke and need help to quit. E-cigarettes or smokeless tobacco still contain nicotine. Talk to your healthcare provider before you use these products.       •Do not use illegal drugs. Drugs such as meth and cocaine can increase your heart rate. Talk to your healthcare provider if you use illegal drugs and want to quit.      •Get more rest. Fatigue can cause your heart rate to increase. Ask your healthcare provider about the best exercise plan for you.      •Eat heart-healthy foods. These include fruits, vegetables, whole-grain breads, low-fat dairy products, beans, lean meats, and fish. Replace butter and margarine with heart-healthy oils such as olive oil and canola oil.             •Exercise regularly and maintain a healthy weight. Ask about the best exercise plan for you. Ask your healthcare provider what a healthy weight is for you. Ask him or her to help you create a safe weight loss plan if you are overweight.   FAMILY WALKING FOR EXERCISE           •Learn ways to cope with stress. Stress, fear, and anxiety can cause a fast heart rate. Your healthcare provider may recommend relaxation techniques and deep breathing exercises. Your healthcare provider may recommend you talk to someone about your stress or anxiety, such as a counselor or a trusted friend.      •Talk to your healthcare provider about all your current medicines. He or she may change a medicine if it is causing your fast heart rate. Do not stop taking any medicine unless directed by your provider.      Follow up with your doctor or cardiologist as directed: You may need more tests. Write down your questions so you remember to ask them at your visit.

## 2023-01-01 NOTE — ED ADULT NURSE NOTE - CHIEF COMPLAINT QUOTE
pt 7 weeks postpartum c/o hypertension. diagnosed with post partum preeclampsia pt was on htn meds, stopped 12 days ago per OB when pressures were fine. has been taking pressures 2x day. today had blurry vision, took BP, took 2x dose procardia 30 today.

## 2023-01-01 NOTE — ED PROVIDER NOTE - PHYSICAL EXAMINATION
GEN: Patient awake alert NAD.   HEENT: normocephalic, atraumatic, EOMI, no scleral icterus, moist MM  CARDIAC: tachycardia, S1, S2, no murmur.   PULM: CTA B/L no wheeze, rhonchi, rales.   ABD: soft NT, ND, no rebound no guarding, no CVA tenderness.   MSK: Moving all extremities, no edema.   NEURO: A&Ox3, gait normal, no focal neurological deficits  SKIN: warm, dry, no rash.

## 2023-09-14 ENCOUNTER — NON-APPOINTMENT (OUTPATIENT)
Age: 28
End: 2023-09-14

## 2023-12-18 NOTE — PROGRESS NOTE ADULT - ATTENDING COMMENTS
Patient evaluated  -feeling well with no symptoms of note  -BPs now better controlled with Procardia 60  -will consider discharge this afternoon if continued control noted.
Patient seen and examined at bedside. BPs reviewed. BPs had been well controlled on 30mg procardia XL, however last 2 BPs this morning have been elevated in mild range. Patient does report now feeling very anxious whenever her blood pressure is taken. s/p magnesium, normal HELLP labs. Will monitor BPs today, if continue to be elevated will increase procardia, if WNL then for possible discharge home today.
136

## 2025-02-17 ENCOUNTER — NON-APPOINTMENT (OUTPATIENT)
Age: 30
End: 2025-02-17